# Patient Record
Sex: MALE | Race: WHITE | NOT HISPANIC OR LATINO | Employment: FULL TIME | URBAN - METROPOLITAN AREA
[De-identification: names, ages, dates, MRNs, and addresses within clinical notes are randomized per-mention and may not be internally consistent; named-entity substitution may affect disease eponyms.]

---

## 2017-08-04 ENCOUNTER — OFFICE VISIT (OUTPATIENT)
Dept: INTERNAL MEDICINE | Facility: MEDICAL CENTER | Age: 52
End: 2017-08-04
Payer: MEDICAID

## 2017-08-04 VITALS
HEART RATE: 80 BPM | RESPIRATION RATE: 16 BRPM | DIASTOLIC BLOOD PRESSURE: 70 MMHG | TEMPERATURE: 98.4 F | HEIGHT: 73 IN | SYSTOLIC BLOOD PRESSURE: 112 MMHG | WEIGHT: 280 LBS | BODY MASS INDEX: 37.11 KG/M2

## 2017-08-04 VITALS
WEIGHT: 286.13 LBS | BODY MASS INDEX: 38.76 KG/M2 | HEIGHT: 72 IN | HEART RATE: 60 BPM | OXYGEN SATURATION: 98 % | DIASTOLIC BLOOD PRESSURE: 78 MMHG | TEMPERATURE: 97.9 F | SYSTOLIC BLOOD PRESSURE: 112 MMHG

## 2017-08-04 DIAGNOSIS — R51.9 PERSISTENT HEADACHES: ICD-10-CM

## 2017-08-04 DIAGNOSIS — E66.01 MORBID OBESITY DUE TO EXCESS CALORIES (HCC): ICD-10-CM

## 2017-08-04 DIAGNOSIS — N50.9 TESTICULAR LESION: ICD-10-CM

## 2017-08-04 DIAGNOSIS — F12.90 MARIJUANA USE: ICD-10-CM

## 2017-08-04 DIAGNOSIS — I83.90 VARICOSE VEIN OF LEG: ICD-10-CM

## 2017-08-04 DIAGNOSIS — R79.89 LOW TESTOSTERONE: ICD-10-CM

## 2017-08-04 DIAGNOSIS — R73.03 PREDIABETES: ICD-10-CM

## 2017-08-04 DIAGNOSIS — Z86.73 HX OF TRANSIENT ISCHEMIC ATTACK (TIA): ICD-10-CM

## 2017-08-04 DIAGNOSIS — R12 HEARTBURN: ICD-10-CM

## 2017-08-04 DIAGNOSIS — E66.9 OBESITY (BMI 30-39.9): ICD-10-CM

## 2017-08-04 DIAGNOSIS — G47.33 OSA ON CPAP: ICD-10-CM

## 2017-08-04 DIAGNOSIS — Z12.11 SCREENING FOR COLON CANCER: ICD-10-CM

## 2017-08-04 DIAGNOSIS — G47.00 INSOMNIA, UNSPECIFIED TYPE: ICD-10-CM

## 2017-08-04 DIAGNOSIS — M25.551 RIGHT HIP PAIN: ICD-10-CM

## 2017-08-04 DIAGNOSIS — Z86.73 HX OF TIA (TRANSIENT ISCHEMIC ATTACK) AND STROKE: ICD-10-CM

## 2017-08-04 DIAGNOSIS — F32.A DEPRESSION, UNSPECIFIED DEPRESSION TYPE: ICD-10-CM

## 2017-08-04 PROBLEM — Z72.0 TOBACCO USE: Status: RESOLVED | Noted: 2017-08-04 | Resolved: 2017-08-04

## 2017-08-04 PROBLEM — Z72.0 TOBACCO USE: Status: ACTIVE | Noted: 2017-08-04

## 2017-08-04 PROCEDURE — 99204 OFFICE O/P NEW MOD 45 MIN: CPT | Mod: GC | Performed by: INTERNAL MEDICINE

## 2017-08-04 RX ORDER — ALBUTEROL SULFATE 90 UG/1
2 AEROSOL, METERED RESPIRATORY (INHALATION) EVERY 4 HOURS PRN
COMMUNITY
End: 2017-08-04

## 2017-08-04 RX ORDER — MULTIVITAMIN
TABLET ORAL 2 TIMES DAILY
COMMUNITY
End: 2019-01-07 | Stop reason: CLARIF

## 2017-08-04 ASSESSMENT — PATIENT HEALTH QUESTIONNAIRE - PHQ9
CLINICAL INTERPRETATION OF PHQ2 SCORE: 4
SUM OF ALL RESPONSES TO PHQ QUESTIONS 1-9: 18
5. POOR APPETITE OR OVEREATING: 3 - NEARLY EVERY DAY

## 2017-08-04 NOTE — PATIENT INSTRUCTIONS
Request records from prior PCP  Fasting 8 AM lab work before next visit  Referral to pulmonology, gastroenterology, PT, psychology, MRI brain, US scrotum with doppler  Hip X ray        Insomnia  Insomnia is a sleep disorder that makes it difficult to fall asleep or to stay asleep. Insomnia can cause tiredness (fatigue), low energy, difficulty concentrating, mood swings, and poor performance at work or school.   There are three different ways to classify insomnia:   · Difficulty falling asleep.  · Difficulty staying asleep.  · Waking up too early in the morning.  Any type of insomnia can be long-term (chronic) or short-term (acute). Both are common. Short-term insomnia usually lasts for three months or less. Chronic insomnia occurs at least three times a week for longer than three months.  CAUSES   Insomnia may be caused by another condition, situation, or substance, such as:  · Anxiety.  · Certain medicines.  · Gastroesophageal reflux disease (GERD) or other gastrointestinal conditions.  · Asthma or other breathing conditions.  · Restless legs syndrome, sleep apnea, or other sleep disorders.  · Chronic pain.  · Menopause. This may include hot flashes.  · Stroke.  · Abuse of alcohol, tobacco, or illegal drugs.  · Depression.  · Caffeine.    · Neurological disorders, such as Alzheimer disease.  · An overactive thyroid (hyperthyroidism).  The cause of insomnia may not be known.  RISK FACTORS  Risk factors for insomnia include:  · Gender. Women are more commonly affected than men.  · Age. Insomnia is more common as you get older.  · Stress. This may involve your professional or personal life.  · Income. Insomnia is more common in people with lower income.  · Lack of exercise.    · Irregular work schedule or night shifts.  · Travelling between different time zones.  SIGNS AND SYMPTOMS  If you have insomnia, trouble falling asleep or trouble staying asleep is the main symptom. This may lead to other symptoms, such  as:  · Feeling fatigued.  · Feeling nervous about going to sleep.  · Not feeling rested in the morning.  · Having trouble concentrating.  · Feeling irritable, anxious, or depressed.  TREATMENT   Treatment for insomnia depends on the cause. If your insomnia is caused by an underlying condition, treatment will focus on addressing the condition. Treatment may also include:   · Medicines to help you sleep.  · Counseling or therapy.  · Lifestyle adjustments.  HOME CARE INSTRUCTIONS   · Take medicines only as directed by your health care provider.  · Keep regular sleeping and waking hours. Avoid naps.  · Keep a sleep diary to help you and your health care provider figure out what could be causing your insomnia. Include:    ¨ When you sleep.  ¨ When you wake up during the night.  ¨ How well you sleep.    ¨ How rested you feel the next day.  ¨ Any side effects of medicines you are taking.  ¨ What you eat and drink.    · Make your bedroom a comfortable place where it is easy to fall asleep:  ¨ Put up shades or special blackout curtains to block light from outside.  ¨ Use a white noise machine to block noise.  ¨ Keep the temperature cool.    · Exercise regularly as directed by your health care provider. Avoid exercising right before bedtime.  · Use relaxation techniques to manage stress. Ask your health care provider to suggest some techniques that may work well for you. These may include:  ¨ Breathing exercises.  ¨ Routines to release muscle tension.  ¨ Visualizing peaceful scenes.  · Cut back on alcohol, caffeinated beverages, and cigarettes, especially close to bedtime. These can disrupt your sleep.  · Do not overeat or eat spicy foods right before bedtime. This can lead to digestive discomfort that can make it hard for you to sleep.  · Limit screen use before bedtime. This includes:  ¨ Watching TV.  ¨ Using your smartphone, tablet, and computer.  · Stick to a routine. This can help you fall asleep faster. Try to do a  quiet activity, brush your teeth, and go to bed at the same time each night.  · Get out of bed if you are still awake after 15 minutes of trying to sleep. Keep the lights down, but try reading or doing a quiet activity. When you feel sleepy, go back to bed.  · Make sure that you drive carefully. Avoid driving if you feel very sleepy.  · Keep all follow-up appointments as directed by your health care provider. This is important.  SEEK MEDICAL CARE IF:   · You are tired throughout the day or have trouble in your daily routine due to sleepiness.  · You continue to have sleep problems or your sleep problems get worse.  SEEK IMMEDIATE MEDICAL CARE IF:   · You have serious thoughts about hurting yourself or someone else.     This information is not intended to replace advice given to you by your health care provider. Make sure you discuss any questions you have with your health care provider.     Document Released: 12/15/2001 Document Revised: 01/08/2016 Document Reviewed: 09/18/2015  [employer] Interactive Patient Education ©2016 [employer]

## 2017-08-04 NOTE — MR AVS SNAPSHOT
"        Doni Valverde   2017 8:00 AM   Office Visit   MRN: [id number]    Department:  [facility] - Internal Med   Dept Phone:  [phone]    Description:  Male : 1965   Provider:  Leonardo Sorto M.D.           Reason for Visit     Establish Care     Testicle Pain with lupms x 1yr    Varicose Veins hAD sURGERY    Hip Problem Issues. Trouble walking    Sleep Problem Excessive    Head Pain Right side on and off      Allergies as of 2017     No Known Allergies      You were diagnosed with     Screening for colon cancer   [540407]       CLOTILDE on CPAP   [077691]       Morbid obesity due to excess calories (CMS-HCC)   [6414352]       Depression, unspecified depression type   [4294677]       Low testosterone   [498079]       Testicular lesion   [637547]       Right hip pain   [018129]       Persistent headaches   [110061]         Vital Signs     Blood Pressure Pulse Temperature Height Weight Body Mass Index    112/78 mmHg 60 36.6 °C (97.9 °F) 1.822 m (5' 11.73\") 129.785 kg (286 lb 2 oz) 39.10 kg/m2    Oxygen Saturation Smoking Status                98% Former Smoker          Basic Information     Date Of Birth Sex Race Ethnicity Preferred Language    1965 Male White Non- English      Your appointments     Sep 07, 2017  2:30 PM   Established Patient with Leonardo Sorto M.D.   [facility] / [facility] - Internal Medicine (--)    [address] [place] [id number] [phone]           You will be receiving a confirmation call a few days before your appointment from our automated call confirmation system.              Health Maintenance        Date Due Completion Dates    IMM DTaP/Tdap/Td Vaccine (1 - Tdap) 1985 ---    COLONOSCOPY 2016 ---    IMM INFLUENZA (1) 2017 ---            Current Immunizations     No immunizations on file.      Below and/or attached are the medications your provider expects you to take. Review all of your home medications " and newly ordered medications with your provider and/or pharmacist. Follow medication instructions as directed by your provider and/or pharmacist. Please keep your medication list with you and share with your provider. Update the information when medications are discontinued, doses are changed, or new medications (including over-the-counter products) are added; and carry medication information at all times in the event of emergency situations     Allergies:  No Known Allergies          Medications  Valid as of: August 04, 2017 -  9:38 AM    Generic Name Brand Name Tablet Size Instructions for use    Multiple Vitamin (Tab) Multiple Vitamin Essential  Take  by mouth 2 Times a Day.        .                 Medicines prescribed today were sent to:     [facility] [address] [place] [address] - [address] [id number] [facility] [address]    Phone: [phone] Fax: [phone]    Open 24 Hours?: No      Medication refill instructions:       If your prescription bottle indicates you have medication refills left, it is not necessary to call your provider’s office. Please contact your pharmacy and they will refill your medication.    If your prescription bottle indicates you do not have any refills left, you may request refills at any time through one of the following ways: The online [employer] system (except Urgent Care), by calling your provider’s office, or by asking your pharmacy to contact your provider’s office with a refill request. Medication refills are processed only during regular business hours and may not be available until the next business day. Your provider may request additional information or to have a follow-up visit with you prior to refilling your medication.   *Please Note: Medication refills are assigned a new Rx number when refilled electronically. Your pharmacy may indicate that no refills were authorized even though a new prescription for the same medication is available at the  pharmacy. Please request the medicine by name with the pharmacy before contacting your provider for a refill.        Your To Do List     Future Labs/Procedures Complete By Expires    CHLAMYDIA/GC PCR URINE OR SWAB  As directed 8/4/2018    COMP METABOLIC PANEL  As directed 8/4/2018    DX-HIP-UNILATERAL- WITH PELVIS-4+ RIGHT  As directed 8/4/2018    HEMOGLOBIN A1C  As directed 8/4/2018    HEP B SURFACE ANTIGEN  As directed 8/4/2018    HIV ANTIBODIES  As directed 8/4/2018    LIPID PROFILE  As directed 8/4/2018    MR-BRAIN-WITH & W/O  As directed 8/4/2018    TSH WITH REFLEX TO FT4  As directed 8/4/2018    [id number]  As directed 8/4/2018      Referral     A referral request has been sent to our patient care coordination department. Please allow 3-5 business days for us to process this request and contact you either by phone or mail. If you do not hear from us by the 5th business day, please call us at [phone].        Instructions    Request records from prior PCP  Fasting 8 AM lab work before next visit  Referral to pulmonology, gastroenterology, PT, psychology, MRI brain, US scrotum with doppler  Hip X ray        Insomnia  Insomnia is a sleep disorder that makes it difficult to fall asleep or to stay asleep. Insomnia can cause tiredness (fatigue), low energy, difficulty concentrating, mood swings, and poor performance at work or school.   There are three different ways to classify insomnia:   · Difficulty falling asleep.  · Difficulty staying asleep.  · Waking up too early in the morning.  Any type of insomnia can be long-term (chronic) or short-term (acute). Both are common. Short-term insomnia usually lasts for three months or less. Chronic insomnia occurs at least three times a week for longer than three months.  CAUSES   Insomnia may be caused by another condition, situation, or substance, such as:  · Anxiety.  · Certain medicines.  · Gastroesophageal reflux disease (GERD) or other gastrointestinal  conditions.  · Asthma or other breathing conditions.  · Restless legs syndrome, sleep apnea, or other sleep disorders.  · Chronic pain.  · Menopause. This may include hot flashes.  · Stroke.  · Abuse of alcohol, tobacco, or illegal drugs.  · Depression.  · Caffeine.    · Neurological disorders, such as Alzheimer disease.  · An overactive thyroid (hyperthyroidism).  The cause of insomnia may not be known.  RISK FACTORS  Risk factors for insomnia include:  · Gender. Women are more commonly affected than men.  · Age. Insomnia is more common as you get older.  · Stress. This may involve your professional or personal life.  · Income. Insomnia is more common in people with lower income.  · Lack of exercise.    · Irregular work schedule or night shifts.  · Travelling between different time zones.  SIGNS AND SYMPTOMS  If you have insomnia, trouble falling asleep or trouble staying asleep is the main symptom. This may lead to other symptoms, such as:  · Feeling fatigued.  · Feeling nervous about going to sleep.  · Not feeling rested in the morning.  · Having trouble concentrating.  · Feeling irritable, anxious, or depressed.  TREATMENT   Treatment for insomnia depends on the cause. If your insomnia is caused by an underlying condition, treatment will focus on addressing the condition. Treatment may also include:   · Medicines to help you sleep.  · Counseling or therapy.  · Lifestyle adjustments.  HOME CARE INSTRUCTIONS   · Take medicines only as directed by your health care provider.  · Keep regular sleeping and waking hours. Avoid naps.  · Keep a sleep diary to help you and your health care provider figure out what could be causing your insomnia. Include:    ¨ When you sleep.  ¨ When you wake up during the night.  ¨ How well you sleep.    ¨ How rested you feel the next day.  ¨ Any side effects of medicines you are taking.  ¨ What you eat and drink.    · Make your bedroom a comfortable place where it is easy to fall  asleep:  ¨ Put up shades or special blackout curtains to block light from outside.  ¨ Use a white noise machine to block noise.  ¨ Keep the temperature cool.    · Exercise regularly as directed by your health care provider. Avoid exercising right before bedtime.  · Use relaxation techniques to manage stress. Ask your health care provider to suggest some techniques that may work well for you. These may include:  ¨ Breathing exercises.  ¨ Routines to release muscle tension.  ¨ Visualizing peaceful scenes.  · Cut back on alcohol, caffeinated beverages, and cigarettes, especially close to bedtime. These can disrupt your sleep.  · Do not overeat or eat spicy foods right before bedtime. This can lead to digestive discomfort that can make it hard for you to sleep.  · Limit screen use before bedtime. This includes:  ¨ Watching TV.  ¨ Using your smartphone, tablet, and computer.  · Stick to a routine. This can help you fall asleep faster. Try to do a quiet activity, brush your teeth, and go to bed at the same time each night.  · Get out of bed if you are still awake after 15 minutes of trying to sleep. Keep the lights down, but try reading or doing a quiet activity. When you feel sleepy, go back to bed.  · Make sure that you drive carefully. Avoid driving if you feel very sleepy.  · Keep all follow-up appointments as directed by your health care provider. This is important.  SEEK MEDICAL CARE IF:   · You are tired throughout the day or have trouble in your daily routine due to sleepiness.  · You continue to have sleep problems or your sleep problems get worse.  SEEK IMMEDIATE MEDICAL CARE IF:   · You have serious thoughts about hurting yourself or someone else.     This information is not intended to replace advice given to you by your health care provider. Make sure you discuss any questions you have with your health care provider.     Document Released: 12/15/2001 Document Revised: 01/08/2016 Document Reviewed:  09/18/2015  [employer] Interactive Patient Education ©2016 [employer]            MyChart Status: Patient Declined
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
## 2017-08-07 ENCOUNTER — HOSPITAL ENCOUNTER (OUTPATIENT)
Dept: LAB | Facility: MEDICAL CENTER | Age: 52
End: 2017-08-07
Attending: INTERNAL MEDICINE
Payer: MEDICAID

## 2017-08-07 ENCOUNTER — SLEEP CENTER VISIT (OUTPATIENT)
Dept: SLEEP MEDICINE | Facility: MEDICAL CENTER | Age: 52
End: 2017-08-07
Payer: MEDICAID

## 2017-08-07 VITALS
HEART RATE: 75 BPM | RESPIRATION RATE: 16 BRPM | HEIGHT: 72 IN | WEIGHT: 288 LBS | BODY MASS INDEX: 39.01 KG/M2 | SYSTOLIC BLOOD PRESSURE: 118 MMHG | DIASTOLIC BLOOD PRESSURE: 66 MMHG | OXYGEN SATURATION: 95 %

## 2017-08-07 DIAGNOSIS — Z87.891 FORMER SMOKER: ICD-10-CM

## 2017-08-07 DIAGNOSIS — G47.33 OSA ON CPAP: ICD-10-CM

## 2017-08-07 DIAGNOSIS — E66.9 OBESITY (BMI 30-39.9): ICD-10-CM

## 2017-08-07 PROCEDURE — 99213 OFFICE O/P EST LOW 20 MIN: CPT | Performed by: NURSE PRACTITIONER

## 2017-08-07 NOTE — MR AVS SNAPSHOT
"        Doni Valverde   2017 1:00 PM   Sleep Center Visit   MRN: [id number]    Department:  [facility]   Dept Phone:  [phone]    Description:  Male : 1965   Provider:  ALINA Alfredo           Reason for Visit     Follow-Up           Allergies as of 2017     No Known Allergies      You were diagnosed with     CLOTILDE on CPAP   [196583]       Obesity (BMI 30-39.9)   [588791]       Former smoker   [[id number]]         Vital Signs     Blood Pressure Pulse Respirations Height Weight Body Mass Index    118/66 mmHg 75 16 1.822 m (5' 11.73\") 130.636 kg (288 lb) 39.35 kg/m2    Oxygen Saturation Smoking Status                95% Former Smoker          Basic Information     Date Of Birth Sex Race Ethnicity Preferred Language    1965 Male White Non- English      Your appointments     Aug 09, 2017 12:00 PM   PT New Evaluation 30 Minutes with Payton Holt P.T.   [facility] Physical Therapy [facility] ([address])    [address] [place] [address]  [address]  [facility] [id number] [phone]           Please bring Photo ID, Insurance Cards, list of all Medication and copies of any legal documents (such as Living Will, Power of ) If speaking a language besides English please bring an adult . Please arrive 30 minutes prior for check in and registration.            Sep 07, 2017  2:30 PM   Established Patient with Leonardo Sorto M.D.   [facility] - Internal Medicine (--)    [address]  [address]  [facility] [id number] [phone]           You will be receiving a confirmation call a few days before your appointment from our automated call confirmation system.            Oct 11, 2017 11:20 AM   Follow UP with ALINA Christie   [facility] Sleep Medicine (--)    [address] [facility] [address]  [facility] [id number] [phone]              Problem List              ICD-10-CM Priority Class Noted - Resolved    Heartburn " R12   8/4/2017 - Present    Varicose vein of leg I83.90   8/4/2017 - Present    Insomnia G47.00   8/4/2017 - Present    Obesity (BMI 30-39.9) E66.9   8/4/2017 - Present    CLOTILDE on CPAP G47.33, Z99.89   8/4/2017 - Present    Screening for colon cancer Z12.11   8/4/2017 - Present    Morbid obesity due to excess calories (CMS-HCC) E66.01   8/4/2017 - Present    Testicular lesion N50.9   8/4/2017 - Present    Low testosterone E29.1   8/4/2017 - Present    Persistent headaches R51   8/4/2017 - Present    Depression F32.9   8/4/2017 - Present    Right hip pain M25.551   8/4/2017 - Present    Marijuana use F12.10   8/4/2017 - Present    Prediabetes R73.03   8/4/2017 - Present    Hx of transient ischemic attack (TIA) Z86.73   8/4/2017 - Present    Former smoker Z87.891   8/7/2017 - Present      Health Maintenance        Date Due Completion Dates    IMM DTaP/Tdap/Td Vaccine (1 - Tdap) 6/7/1985 ---    COLONOSCOPY 6/7/2016 ---    IMM INFLUENZA (1) 9/1/2017 ---            Current Immunizations     No immunizations on file.      Below and/or attached are the medications your provider expects you to take. Review all of your home medications and newly ordered medications with your provider and/or pharmacist. Follow medication instructions as directed by your provider and/or pharmacist. Please keep your medication list with you and share with your provider. Update the information when medications are discontinued, doses are changed, or new medications (including over-the-counter products) are added; and carry medication information at all times in the event of emergency situations     Allergies:  No Known Allergies          Medications  Valid as of: August 07, 2017 -  1:34 PM    Generic Name Brand Name Tablet Size Instructions for use    Aspirin (Tablet Delayed Response) ECOTRIN 81 MG Take 1 Tab by mouth every day.        Multiple Vitamin (Tab) Multiple Vitamin Essential  Take  by mouth 2 Times a Day.        .                    Medicines prescribed today were sent to:     [facility] [address] [place] [address], [place] - [address] [facility]    [address] [facility] [place] [address]    Phone: [phone] Fax: [phone]    Open 24 Hours?: No      Medication refill instructions:       If your prescription bottle indicates you have medication refills left, it is not necessary to call your provider’s office. Please contact your pharmacy and they will refill your medication.    If your prescription bottle indicates you do not have any refills left, you may request refills at any time through one of the following ways: The online [employer] system (except Urgent Care), by calling your provider’s office, or by asking your pharmacy to contact your provider’s office with a refill request. Medication refills are processed only during regular business hours and may not be available until the next business day. Your provider may request additional information or to have a follow-up visit with you prior to refilling your medication.   *Please Note: Medication refills are assigned a new Rx number when refilled electronically. Your pharmacy may indicate that no refills were authorized even though a new prescription for the same medication is available at the pharmacy. Please request the medicine by name with the pharmacy before contacting your provider for a refill.           [employer] Status: Patient Declined

## 2017-08-07 NOTE — PROGRESS NOTES
Chief Complaint   Patient presents with   • Follow-Up       HPI:  Doni Valverde is a 52 y.o. year old male here today for follow-up on CLOTILDE. Last OV was 11/24/15. PCP referred him back due to increased fatigue and insomnia. PSG indicates severe obstructive sleep apnea with an overall AHI 53.5 and a minimum oxygen saturation of 74%. Patient was started on CPAP 11 cm H2O. Compliance card 5/3/2017 through 7/31/2017 indicates 5.6% compliance, average nightly usage of 4.5 hours, no significant mastectomy and an AHI 0.9. Patient recently saw his primary care provider and noted persistent headaches, fatigue and insomnia. PCP recommended starting melatonin. Patient hasn't been able to use machine due to age of mask/supplies. BMI 39. Patient was 275lbs during last sleep study, today 288lbs. He is very motivated to restart therapy.    In regards to respiratory status, patient is currently not using inhalers. In the past PFT indicated moderate mixed obstructive and restrictive spirometry with normal lung volumes. DLCO is normal. He was placed on Dulera 2 puffs twice daily and albuterol HFA inhaler as needed. He states since treating sleep apnea his dyspnea and heart burn resolved. He did have one episode of GERD 1 month ago and really wants to restart cpap therapy. Echo 16 2015 indicated LVEF of 65% with normal RVSP calculated. No significant valve abnormalities noted.          ROS: As per HPI and otherwise negative if not stated.    Past Medical History   Diagnosis Date   • Sleep apnea    • Varicose vein of leg    • Headache    • Hx of transient ischemic attack (TIA)    • Insomnia        Past Surgical History   Procedure Laterality Date   • Other       Varicose vein stripping       Family History   Problem Relation Age of Onset   • Cancer Father      Prostate   • Diabetes Father    • Kidney Disease Father    • Other Mother      Hypoglycemia   • Other Mother      Vein issues       Social History     Social History   •  "Marital Status: Single     Spouse Name: N/A   • Number of Children: N/A   • Years of Education: N/A     Occupational History   • Not on file.     Social History Main Topics   • Smoking status: Former Smoker -- 1.00 packs/day for 32 years     Types: Cigarettes     Start date: 01/01/1980     Quit date: 01/01/2012   • Smokeless tobacco: Never Used   • Alcohol Use: No   • Drug Use: Yes     Special: Marijuana   • Sexual Activity: Not on file     Other Topics Concern   • Not on file     Social History Narrative       Allergies as of 08/07/2017   • (No Known Allergies)        @Vital signs for this encounter:  Filed Vitals:    08/07/17 1254   Height: 1.822 m (5' 11.73\")   Weight: 130.636 kg (288 lb)   Weight % change since last entry.: 0 %   BP: 118/66   Pulse: 75   BMI (Calculated): 39.35   Resp: 16   O2 sat % room air: 95 %       Current medications as of today   Current Outpatient Prescriptions   Medication Sig Dispense Refill   • Multiple Vitamin Essential Tab Take  by mouth 2 Times a Day.     • aspirin EC (ECOTRIN) 81 MG Tablet Delayed Response Take 1 Tab by mouth every day. 30 Tab 2     No current facility-administered medications for this visit.         Physical Exam:   Gen:           Alert and oriented, No apparent distress. Mood and affect appropriate, normal interaction with examiner.  Eyes:          PERRL, EOM intact, sclere white, conjunctive moist.  Ears:          Not examined.   Hearing:     Grossly intact.  Nose:          Normal, no lesions or deformities.  Dentition:    Good dentition.  Oropharynx:   Tongue normal, posterior pharynx without erythema or exudate.  Mallampati Classification: 3  Neck:        Supple, trachea midline, no masses.  Respiratory Effort: No intercostal retractions or use of accessory muscles.   Lung Auscultation:      Clear to auscultation bilaterally; no rales, rhonchi or wheezing.  CV:            Regular rate and rhythm. No murmurs, rubs or gallops.  Abd:           Not examined. "   Lymphadenopathy: Not examined.  Gait and Station: Normal.  Digits and Nails: No clubbing, cyanosis, petechiae, or nodes.   Cranial Nerves: II-XII grossly intact.  Skin:        No rashes, lesions or ulcers noted.               Ext:           No cyanosis or edema.      Assessment:  1. CLOTILDE on CPAP     2. Obesity (BMI 30-39.9)  HEIGHT AND WEIGHT   3. Former smoker         Immunizations:    Flu:not given  Pneumovax 23:not given  Prevnar 13:not given    Plan:  1. Continue CPAP nightly. DME mask /supplies now.  2. Discussed sleep hygiene.  3. Encouraged weight loss.  4. Follow up in 2 months with compliance card with APRN, sooner if needed.

## 2017-08-09 ENCOUNTER — APPOINTMENT (OUTPATIENT)
Dept: PHYSICAL THERAPY | Facility: REHABILITATION | Age: 52
End: 2017-08-09
Attending: INTERNAL MEDICINE
Payer: MEDICAID

## 2017-09-07 ENCOUNTER — APPOINTMENT (OUTPATIENT)
Dept: INTERNAL MEDICINE | Facility: MEDICAL CENTER | Age: 52
End: 2017-09-07
Payer: MEDICAID

## 2017-12-02 ENCOUNTER — HOSPITAL ENCOUNTER (EMERGENCY)
Facility: MEDICAL CENTER | Age: 52
End: 2017-12-02
Attending: EMERGENCY MEDICINE
Payer: MEDICAID

## 2017-12-02 ENCOUNTER — APPOINTMENT (OUTPATIENT)
Dept: RADIOLOGY | Facility: MEDICAL CENTER | Age: 52
End: 2017-12-02
Attending: EMERGENCY MEDICINE
Payer: MEDICAID

## 2017-12-02 VITALS
DIASTOLIC BLOOD PRESSURE: 81 MMHG | HEART RATE: 66 BPM | OXYGEN SATURATION: 94 % | HEIGHT: 73 IN | SYSTOLIC BLOOD PRESSURE: 117 MMHG | TEMPERATURE: 99 F | BODY MASS INDEX: 38.19 KG/M2 | WEIGHT: 288.14 LBS | RESPIRATION RATE: 16 BRPM

## 2017-12-02 DIAGNOSIS — J01.00 ACUTE MAXILLARY SINUSITIS, RECURRENCE NOT SPECIFIED: ICD-10-CM

## 2017-12-02 LAB
ALBUMIN SERPL BCP-MCNC: 4.1 G/DL (ref 3.2–4.9)
ALBUMIN/GLOB SERPL: 1 G/DL
ALP SERPL-CCNC: 104 U/L (ref 30–99)
ALT SERPL-CCNC: 42 U/L (ref 2–50)
ANION GAP SERPL CALC-SCNC: 11 MMOL/L (ref 0–11.9)
AST SERPL-CCNC: 22 U/L (ref 12–45)
BASOPHILS # BLD AUTO: 0.9 % (ref 0–1.8)
BASOPHILS # BLD: 0.12 K/UL (ref 0–0.12)
BILIRUB SERPL-MCNC: 0.4 MG/DL (ref 0.1–1.5)
BUN SERPL-MCNC: 26 MG/DL (ref 8–22)
CALCIUM SERPL-MCNC: 9.8 MG/DL (ref 8.5–10.5)
CHLORIDE SERPL-SCNC: 100 MMOL/L (ref 96–112)
CO2 SERPL-SCNC: 23 MMOL/L (ref 20–33)
CREAT SERPL-MCNC: 1.2 MG/DL (ref 0.5–1.4)
EOSINOPHIL # BLD AUTO: 0.37 K/UL (ref 0–0.51)
EOSINOPHIL NFR BLD: 2.7 % (ref 0–6.9)
ERYTHROCYTE [DISTWIDTH] IN BLOOD BY AUTOMATED COUNT: 43 FL (ref 35.9–50)
GFR SERPL CREATININE-BSD FRML MDRD: >60 ML/MIN/1.73 M 2
GLOBULIN SER CALC-MCNC: 4 G/DL (ref 1.9–3.5)
GLUCOSE SERPL-MCNC: 87 MG/DL (ref 65–99)
HCT VFR BLD AUTO: 48.7 % (ref 42–52)
HGB BLD-MCNC: 16.2 G/DL (ref 14–18)
IMM GRANULOCYTES # BLD AUTO: 0.03 K/UL (ref 0–0.11)
IMM GRANULOCYTES NFR BLD AUTO: 0.2 % (ref 0–0.9)
LYMPHOCYTES # BLD AUTO: 4.04 K/UL (ref 1–4.8)
LYMPHOCYTES NFR BLD: 29.5 % (ref 22–41)
MCH RBC QN AUTO: 28.5 PG (ref 27–33)
MCHC RBC AUTO-ENTMCNC: 33.3 G/DL (ref 33.7–35.3)
MCV RBC AUTO: 85.6 FL (ref 81.4–97.8)
MONOCYTES # BLD AUTO: 1.1 K/UL (ref 0–0.85)
MONOCYTES NFR BLD AUTO: 8 % (ref 0–13.4)
NEUTROPHILS # BLD AUTO: 8.02 K/UL (ref 1.82–7.42)
NEUTROPHILS NFR BLD: 58.7 % (ref 44–72)
NRBC # BLD AUTO: 0 K/UL
NRBC BLD AUTO-RTO: 0 /100 WBC
PLATELET # BLD AUTO: 333 K/UL (ref 164–446)
PMV BLD AUTO: 10.2 FL (ref 9–12.9)
POTASSIUM SERPL-SCNC: 3.7 MMOL/L (ref 3.6–5.5)
PROT SERPL-MCNC: 8.1 G/DL (ref 6–8.2)
RBC # BLD AUTO: 5.69 M/UL (ref 4.7–6.1)
SODIUM SERPL-SCNC: 134 MMOL/L (ref 135–145)
WBC # BLD AUTO: 13.7 K/UL (ref 4.8–10.8)

## 2017-12-02 PROCEDURE — 71010 DX-CHEST-LIMITED (1 VIEW): CPT

## 2017-12-02 PROCEDURE — 700117 HCHG RX CONTRAST REV CODE 255: Performed by: EMERGENCY MEDICINE

## 2017-12-02 PROCEDURE — 85025 COMPLETE CBC W/AUTO DIFF WBC: CPT

## 2017-12-02 PROCEDURE — 99284 EMERGENCY DEPT VISIT MOD MDM: CPT

## 2017-12-02 PROCEDURE — 36415 COLL VENOUS BLD VENIPUNCTURE: CPT

## 2017-12-02 PROCEDURE — 70487 CT MAXILLOFACIAL W/DYE: CPT

## 2017-12-02 PROCEDURE — 80053 COMPREHEN METABOLIC PANEL: CPT

## 2017-12-02 RX ORDER — METRONIDAZOLE 500 MG/1
500 TABLET ORAL 3 TIMES DAILY
Qty: 30 TAB | Refills: 0 | Status: SHIPPED | OUTPATIENT
Start: 2017-12-02 | End: 2017-12-12

## 2017-12-02 RX ORDER — PREDNISONE 20 MG/1
60 TABLET ORAL DAILY
Qty: 30 TAB | Refills: 0 | Status: SHIPPED | OUTPATIENT
Start: 2017-12-02 | End: 2017-12-07

## 2017-12-02 RX ORDER — OXYMETAZOLINE HYDROCHLORIDE 0.05 G/100ML
2 SPRAY NASAL 2 TIMES DAILY
Qty: 1 BOTTLE | Refills: 0 | Status: SHIPPED | OUTPATIENT
Start: 2017-12-02 | End: 2018-03-21

## 2017-12-02 RX ORDER — CEFDINIR 300 MG/1
300 CAPSULE ORAL 2 TIMES DAILY
Qty: 20 CAP | Refills: 0 | Status: SHIPPED | OUTPATIENT
Start: 2017-12-02 | End: 2018-03-21

## 2017-12-02 RX ADMIN — IOHEXOL 70 ML: 350 INJECTION, SOLUTION INTRAVENOUS at 20:43

## 2017-12-03 NOTE — DISCHARGE INSTRUCTIONS

## 2017-12-03 NOTE — ED NOTES
"Pt to triage with multiple complaints, c/o \" being on antibx on/off for 10 months, it's killing his stomach\", had 5 teeth pulled 1approx 1 month ago, states \" he feels there is an infection/ drainage, worried it's in his blood stream\" , c/o cough, \" everything is draining down the back of his throat   "

## 2017-12-03 NOTE — ED PROVIDER NOTES
ED Provider Note    HPI: Patient is a 52-year-old male who presented to the emergency department December 2, 2017 at 6:01 PM with a chief complaint of cough and sinus drainage.    Patient states he's been on and off antibiotic treatment for the last 10 months. He had 5 teeth pulled about a month ago and is awaiting some sort of surgery closed connection between one of the extraction sites in the sinus. He notes posterior sinus drainage that is foul tasting. He also notes a cough. No fever no headache no neck stiffness no photophobia. No change in bladder or bowel habits. No other somatic    Review of Systems: Positive for sinus drainage cough negative for fever or headache neck stiffness photophobia change in bladder or bowel habits. Review of systems reviewed with patient, all other systems negative    Past medical/surgical history: Dental problems status post multiple dental extractions sleep apnea varicose vein headache TIA    Medications: Ecotrin    Allergies: None    Social History: 32-pack-year history of smoking quit in 1980 alcohol use      Physical exam: Constitutional: Somewhat obese male awake alert appeared tired  Vital signs: temperature 99.0 pulse 72 respirations 16 blood pressure 117/81 pulse oximetry 95%  EYES: PERRL, EOMI, Conjunctivae and sclera normal, eyelids normal bilaterally.  Neck: Trachea midline. No cervical masses seen or palpated. Normal range of motion, supple. No meningeal signs elicited.  Cardiac: Regular rate and rhythm. S1-S2 present. No S3 or S4 present. No murmurs, rubs, or gallops heard. No edema or varicosities were seen.   Lungs: Clear to auscultation with good aeration throughout. No wheezes, rales, or rhonchi heard. Patient's chest wall moved symmetrically with each respiratory effort. Patient was not making use of accessory muscles of respiration in breathing.  Abdomen: Soft nontender to palpation. No rebound or guarding elicited. No organomegaly identified. No pulsatile  abdominal masses identified.   Musculoskeletal:  no  pain with palpitation or movement of muscle, bone or joint , no obvious musculoskeletal deformities identified.  Neurologic: alert and awake answers questions appropriately. Moves all four extremities independently, no gross focal abnormalities identified. Normal strength and motor.  Skin: no rash or lesion seen, no palpable dermatologic lesions identified.  Psychiatric: not anxious, delusional, or hallucinating.    Medical decision making:  Laboratory studies obtained (please see labsheet for all results) significant findings included a moderate leukocytosis of 13.7 with a slight increase in absolute neutrophils consistent with infection. Chemistry panel was unrevealing    Chest x-ray obtained; no evidence pneumonia seen.    After discussion with radiologist, CT imaging of the maxillofacial area obtained with IV contrast. Inflammatory changes of the left maxillary sinus left ethmoid sinus and left aspect of the frontal sinuses were noted which could represent either sinusitis or secondary sinusitis from tooth extraction. Absent bone is noted in the floor left maxillary sinus felt to be consistent with resection following tooth extraction.    ENT consulted; they recommended antibiotic treatment to include anaerobe coverage and follow-up with the patient's dental specialist on Monday. This seems reasonable. Patient discharged on Flagyl and Ceftin there. Patient given the usual discharge instructions for sinusitis. He is carefully counseled return immediately for any significant headache visual disturbance vomiting or other problems.    Patient does not appear to be systemically ill or toxic. He certainly did seem to require outpatient treatment with close follow-up and we have arranged this. Patient verbalized understanding of the above instructions and stated would comply    Impression sinusitis, maxillary, acute

## 2017-12-03 NOTE — ED NOTES
Patient dc'd to home w/ family. Patient alert and oriented x 4. Patient ambulatory w/ steady gait. Patient verbalizes understanding of discharge instructions follow up care and prescriptions x 4. Patient denies questions upon discharge.     Per ERP verbal order, patient provided w/ lab results to take to his oral surgeon follow up.

## 2018-02-02 ENCOUNTER — HOSPITAL ENCOUNTER (EMERGENCY)
Facility: MEDICAL CENTER | Age: 53
End: 2018-02-02
Attending: EMERGENCY MEDICINE
Payer: MEDICAID

## 2018-02-02 ENCOUNTER — APPOINTMENT (OUTPATIENT)
Dept: RADIOLOGY | Facility: MEDICAL CENTER | Age: 53
End: 2018-02-02
Attending: EMERGENCY MEDICINE
Payer: MEDICAID

## 2018-02-02 VITALS
DIASTOLIC BLOOD PRESSURE: 82 MMHG | BODY MASS INDEX: 37.6 KG/M2 | TEMPERATURE: 98.6 F | RESPIRATION RATE: 16 BRPM | SYSTOLIC BLOOD PRESSURE: 121 MMHG | OXYGEN SATURATION: 95 % | WEIGHT: 283.73 LBS | HEART RATE: 72 BPM | HEIGHT: 73 IN

## 2018-02-02 DIAGNOSIS — S93.402A SPRAIN OF LEFT ANKLE, UNSPECIFIED LIGAMENT, INITIAL ENCOUNTER: ICD-10-CM

## 2018-02-02 PROCEDURE — 302874 HCHG BANDAGE ACE 2 OR 3""

## 2018-02-02 PROCEDURE — 99284 EMERGENCY DEPT VISIT MOD MDM: CPT

## 2018-02-02 PROCEDURE — 73610 X-RAY EXAM OF ANKLE: CPT | Mod: LT

## 2018-02-02 PROCEDURE — 29515 APPLICATION SHORT LEG SPLINT: CPT

## 2018-02-02 ASSESSMENT — PAIN SCALES - GENERAL: PAINLEVEL_OUTOF10: 6
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
## 2018-02-02 NOTE — DISCHARGE INSTRUCTIONS
Cryotherapy  Cryotherapy means treatment with cold. Ice or gel packs can be used to reduce both pain and swelling. Ice is the most helpful within the first 24 to 48 hours after an injury or flare-up from overusing a muscle or joint. Sprains, strains, spasms, burning pain, shooting pain, and aches can all be eased with ice. Ice can also be used when recovering from surgery. Ice is effective, has very few side effects, and is safe for most people to use.  PRECAUTIONS   Ice is not a safe treatment option for people with:  · Raynaud phenomenon. This is a condition affecting small blood vessels in the extremities. Exposure to cold may cause your problems to return.  · Cold hypersensitivity. There are many forms of cold hypersensitivity, including:  ¨ Cold urticaria. Red, itchy hives appear on the skin when the tissues begin to warm after being iced.  ¨ Cold erythema. This is a red, itchy rash caused by exposure to cold.  ¨ Cold hemoglobinuria. Red blood cells break down when the tissues begin to warm after being iced. The hemoglobin that carry oxygen are passed into the urine because they cannot combine with blood proteins fast enough.  · Numbness or altered sensitivity in the area being iced.  If you have any of the following conditions, do not use ice until you have discussed cryotherapy with your caregiver:  · Heart conditions, such as arrhythmia, angina, or chronic heart disease.  · High blood pressure.  · Healing wounds or open skin in the area being iced.  · Current infections.  · Rheumatoid arthritis.  · Poor circulation.  · Diabetes.  Ice slows the blood flow in the region it is applied. This is beneficial when trying to stop inflamed tissues from spreading irritating chemicals to surrounding tissues. However, if you expose your skin to cold temperatures for too long or without the proper protection, you can damage your skin or nerves. Watch for signs of skin damage due to cold.  HOME CARE INSTRUCTIONS  Follow  these tips to use ice and cold packs safely.  · Place a dry or damp towel between the ice and skin. A damp towel will cool the skin more quickly, so you may need to shorten the time that the ice is used.  · For a more rapid response, add gentle compression to the ice.  · Ice for no more than 10 to 20 minutes at a time. The bonier the area you are icing, the less time it will take to get the benefits of ice.  · Check your skin after 5 minutes to make sure there are no signs of a poor response to cold or skin damage.  · Rest 20 minutes or more between uses.  · Once your skin is numb, you can end your treatment. You can test numbness by very lightly touching your skin. The touch should be so light that you do not see the skin dimple from the pressure of your fingertip. When using ice, most people will feel these normal sensations in this order: cold, burning, aching, and numbness.  · Do not use ice on someone who cannot communicate their responses to pain, such as small children or people with dementia.  HOW TO MAKE AN ICE PACK  Ice packs are the most common way to use ice therapy. Other methods include ice massage, ice baths, and cryosprays. Muscle creams that cause a cold, tingly feeling do not offer the same benefits that ice offers and should not be used as a substitute unless recommended by your caregiver.  To make an ice pack, do one of the following:  · Place crushed ice or a bag of frozen vegetables in a sealable plastic bag. Squeeze out the excess air. Place this bag inside another plastic bag. Slide the bag into a pillowcase or place a damp towel between your skin and the bag.  · Mix 3 parts water with 1 part rubbing alcohol. Freeze the mixture in a sealable plastic bag. When you remove the mixture from the freezer, it will be slushy. Squeeze out the excess air. Place this bag inside another plastic bag. Slide the bag into a pillowcase or place a damp towel between your skin and the bag.  SEEK MEDICAL CARE  IF:  · You develop white spots on your skin. This may give the skin a blotchy (mottled) appearance.  · Your skin turns blue or pale.  · Your skin becomes waxy or hard.  · Your swelling gets worse.  MAKE SURE YOU:   · Understand these instructions.  · Will watch your condition.  · Will get help right away if you are not doing well or get worse.     This information is not intended to replace advice given to you by your health care provider. Make sure you discuss any questions you have with your health care provider.     Document Released: 08/13/2012 Document Revised: 01/08/2016 Document Reviewed: 08/13/2012  [employer] Interactive Patient Education ©2016 [employer]      Ankle Sprain  An ankle sprain is an injury to the strong, fibrous tissues (ligaments) that hold the bones of your ankle joint together.   CAUSES  An ankle sprain is usually caused by a fall or by twisting your ankle. Ankle sprains most commonly occur when you step on the outer edge of your foot, and your ankle turns inward. People who participate in sports are more prone to these types of injuries.   SYMPTOMS   · Pain in your ankle. The pain may be present at rest or only when you are trying to stand or walk.  · Swelling.  · Bruising. Bruising may develop immediately or within 1 to 2 days after your injury.  · Difficulty standing or walking, particularly when turning corners or changing directions.  DIAGNOSIS   Your caregiver will ask you details about your injury and perform a physical exam of your ankle to determine if you have an ankle sprain. During the physical exam, your caregiver will press on and apply pressure to specific areas of your foot and ankle. Your caregiver will try to move your ankle in certain ways. An X-ray exam may be done to be sure a bone was not broken or a ligament did not separate from one of the bones in your ankle (avulsion fracture).   TREATMENT   Certain types of braces can help stabilize your ankle. Your caregiver can  make a recommendation for this. Your caregiver may recommend the use of medicine for pain. If your sprain is severe, your caregiver may refer you to a surgeon who helps to restore function to parts of your skeletal system (orthopedist) or a physical therapist.  HOME CARE INSTRUCTIONS   · Apply ice to your injury for 1-2 days or as directed by your caregiver. Applying ice helps to reduce inflammation and pain.  ¨ Put ice in a plastic bag.  ¨ Place a towel between your skin and the bag.  ¨ Leave the ice on for 15-20 minutes at a time, every 2 hours while you are awake.  · Only take over-the-counter or prescription medicines for pain, discomfort, or fever as directed by your caregiver.  · Elevate your injured ankle above the level of your heart as much as possible for 2-3 days.  · If your caregiver recommends crutches, use them as instructed. Gradually put weight on the affected ankle. Continue to use crutches or a cane until you can walk without feeling pain in your ankle.  · If you have a plaster splint, wear the splint as directed by your caregiver. Do not rest it on anything harder than a pillow for the first 24 hours. Do not put weight on it. Do not get it wet. You may take it off to take a shower or bath.  · You may have been given an elastic bandage to wear around your ankle to provide support. If the elastic bandage is too tight (you have numbness or tingling in your foot or your foot becomes cold and blue), adjust the bandage to make it comfortable.  · If you have an air splint, you may blow more air into it or let air out to make it more comfortable. You may take your splint off at night and before taking a shower or bath. Wiggle your toes in the splint several times per day to decrease swelling.  SEEK MEDICAL CARE IF:   · You have rapidly increasing bruising or swelling.  · Your toes feel extremely cold or you lose feeling in your foot.  · Your pain is not relieved with medicine.  SEEK IMMEDIATE MEDICAL CARE  IF:  · Your toes are numb or blue.  · You have severe pain that is increasing.  MAKE SURE YOU:   · Understand these instructions.  · Will watch your condition.  · Will get help right away if you are not doing well or get worse.     This information is not intended to replace advice given to you by your health care provider. Make sure you discuss any questions you have with your health care provider.     Document Released: 12/18/2006 Document Revised: 01/08/2016 Document Reviewed: 12/29/2012  [employer] Interactive Patient Education ©2016 [employer]

## 2018-02-12 ENCOUNTER — APPOINTMENT (OUTPATIENT)
Dept: SLEEP MEDICINE | Facility: MEDICAL CENTER | Age: 53
End: 2018-02-12
Payer: MEDICAID

## 2018-03-21 ENCOUNTER — OFFICE VISIT (OUTPATIENT)
Dept: MEDICAL GROUP | Facility: MEDICAL CENTER | Age: 53
End: 2018-03-21
Attending: FAMILY MEDICINE
Payer: MEDICAID

## 2018-03-21 VITALS
TEMPERATURE: 98.2 F | SYSTOLIC BLOOD PRESSURE: 112 MMHG | WEIGHT: 294 LBS | DIASTOLIC BLOOD PRESSURE: 70 MMHG | HEART RATE: 76 BPM | RESPIRATION RATE: 14 BRPM | BODY MASS INDEX: 38.97 KG/M2 | HEIGHT: 73 IN | OXYGEN SATURATION: 94 %

## 2018-03-21 DIAGNOSIS — M25.559 ARTHRALGIA OF HIP, UNSPECIFIED LATERALITY: ICD-10-CM

## 2018-03-21 DIAGNOSIS — G47.33 OSA ON CPAP: ICD-10-CM

## 2018-03-21 DIAGNOSIS — H53.8 BLURRY VISION, BILATERAL: ICD-10-CM

## 2018-03-21 DIAGNOSIS — G89.4 CHRONIC PAIN SYNDROME: ICD-10-CM

## 2018-03-21 DIAGNOSIS — F39 MOOD DISORDER (HCC): ICD-10-CM

## 2018-03-21 DIAGNOSIS — Z00.00 HEALTH CARE MAINTENANCE: ICD-10-CM

## 2018-03-21 DIAGNOSIS — R05.3 CHRONIC COUGH: ICD-10-CM

## 2018-03-21 PROCEDURE — 99203 OFFICE O/P NEW LOW 30 MIN: CPT | Performed by: FAMILY MEDICINE

## 2018-03-21 PROCEDURE — 99204 OFFICE O/P NEW MOD 45 MIN: CPT | Performed by: FAMILY MEDICINE

## 2018-03-21 RX ORDER — BENZONATATE 100 MG/1
100 CAPSULE ORAL 3 TIMES DAILY PRN
Qty: 60 CAP | Refills: 0 | Status: SHIPPED | OUTPATIENT
Start: 2018-03-21 | End: 2018-04-04

## 2018-03-21 RX ORDER — GABAPENTIN 300 MG/1
300 CAPSULE ORAL 3 TIMES DAILY
Qty: 90 CAP | Refills: 3 | Status: SHIPPED | OUTPATIENT
Start: 2018-03-21 | End: 2019-01-07 | Stop reason: CLARIF

## 2018-03-21 RX ORDER — IBUPROFEN 800 MG/1
800 TABLET ORAL EVERY 8 HOURS PRN
Qty: 60 TAB | Refills: 3 | Status: SHIPPED | OUTPATIENT
Start: 2018-03-21 | End: 2019-01-07 | Stop reason: CLARIF

## 2018-03-21 RX ORDER — ALBUTEROL SULFATE 90 UG/1
2 AEROSOL, METERED RESPIRATORY (INHALATION) EVERY 6 HOURS PRN
Qty: 8.5 G | Refills: 3 | Status: SHIPPED | OUTPATIENT
Start: 2018-03-21 | End: 2019-01-07 | Stop reason: CLARIF

## 2018-03-21 ASSESSMENT — ENCOUNTER SYMPTOMS
NAUSEA: 0
PALPITATIONS: 0
CHILLS: 0
FEVER: 0
INSOMNIA: 1
PHOTOPHOBIA: 0
HALLUCINATIONS: 0
DOUBLE VISION: 0
VOMITING: 0
EYE PAIN: 0
NECK PAIN: 0
SPEECH CHANGE: 0
NERVOUS/ANXIOUS: 0
ABDOMINAL PAIN: 0
SHORTNESS OF BREATH: 0
COUGH: 1
SENSORY CHANGE: 0
EYE DISCHARGE: 0
TREMORS: 0
HEADACHES: 0
HEARTBURN: 0
TINGLING: 1
BACK PAIN: 1
BLURRED VISION: 1
SPUTUM PRODUCTION: 1
DIZZINESS: 0
DEPRESSION: 1
EYE REDNESS: 0

## 2018-03-21 ASSESSMENT — PATIENT HEALTH QUESTIONNAIRE - PHQ9: CLINICAL INTERPRETATION OF PHQ2 SCORE: 0

## 2018-03-21 ASSESSMENT — LIFESTYLE VARIABLES: SUBSTANCE_ABUSE: 0

## 2018-03-21 NOTE — PROGRESS NOTES
Subjective:      Doni Valverde is a 52 y.o. male who presents with Cough (x1 month) and Establish Care            Patient 52-year-old male here to establish the clinic today. He has a history of chronic pain in his hips, ankles, and back, obstructive sleep apnea on CPAP, chronic cough that has been worsening over the last few weeks, rapidly worsening eyesight, anxiety and depression, and possible metabolic syndrome.    He has been having worsening pain in his hips ankles and back for the last several years now. Patient states that he used to work as a  but over the last 7-8 years has not been able to work due to the chronic pain he has been suffering from. He had seen many physicians in the past for his chronic pain, but no effective treatments were performed. He has been told in the past that he may eventually need a hip replacement to help his chronic hip pain. He has been using Motrin to help alleviate his pain and it does help but his pain has become worse. Will order an x-ray of his hip, ankle and back. Also will start Neurontin at 300 mg 3 times a day. Discussed the potential adverse effects of both medications. Also discussed the physical therapy and orthopedic referral if needed. We'll continue to follow.    He had been diagnosed with obstructive sleep apnea and had been placed on a CPAP. He had been seeing a pulmonologist but recently his pulmonologist informed him that they are no longer accepting his insurance. So will be refer to a pulmonologist at accepts his insurance.  He has also been having a chronic cough that has been present for several years. Recently his cough has become more persistent. He states that one night he almost passed out due to his persistent cough. He has been seen in the emergency rooms for this cough and had been started on inhalers and antibiotics, which have not worked to alleviate his cough. A CT scan of his chest will also be ordered, and will have patient  continue using his albuterol inhaler as directed and a Spiriva will be started. We'll also order a pulmonary function tests for further evaluation of his overall lung function. He is a former smoker but has not smoked in the last 10 years per patient he also had been a user of marijuana but is no longer able to smoke marijuana. We'll continue to follow.    Due to his chronic cough as well as chronic pain patient states that he is becoming depressed. He also had been told she has anger management issues by his previous provider. He is not having any urges to harm self or others. Discussed the potential for disorders to worsen chronic pain. Will refer to psychology for additional assistance in management of his chronic pain psychology as well as possible depression. We'll continue to follow. ER precautions given if he should have any sudden worsening of his current situation.    Is also noticed that his eyesight has been worsening at a more rapid rate. He states that diabetes runs in his family but he has not been officially told that he was diabetic or have high blood sugars. Will order lab work to further assess. Discussed low glycemic index foods as well as exercising as tolerated despite his chronic pain. We'll continue to follow.    His surgical history includes a vein stripping procedure.    His family history is significant for diabetes and hypertension.    He is  and currently unemployed.    He is a former smoker, denies drinking alcohol and eats marijuana.        Review of Systems   Constitutional: Negative for chills and fever.   HENT: Negative for hearing loss and tinnitus.    Eyes: Positive for blurred vision. Negative for double vision, photophobia, pain, discharge and redness.   Respiratory: Positive for cough and sputum production. Negative for shortness of breath.    Cardiovascular: Negative for chest pain and palpitations.   Gastrointestinal: Negative for abdominal pain, heartburn, nausea and  "vomiting.   Genitourinary: Negative.    Musculoskeletal: Positive for back pain and joint pain. Negative for neck pain.   Skin: Negative for itching and rash.   Neurological: Positive for tingling. Negative for dizziness, tremors, sensory change, speech change and headaches.   Endo/Heme/Allergies: Negative.    Psychiatric/Behavioral: Positive for depression. Negative for hallucinations, substance abuse and suicidal ideas. The patient has insomnia. The patient is not nervous/anxious.           Objective:     /70   Pulse 76   Temp 36.8 °C (98.2 °F)   Resp 14   Ht 1.854 m (6' 1\")   Wt (!) 133.4 kg (294 lb)   SpO2 94%   BMI 38.79 kg/m²      Physical Exam   Constitutional: He is oriented to person, place, and time.   BMI 38   HENT:   Head: Normocephalic and atraumatic.   Nose: Nose normal.   Mouth/Throat: Oropharynx is clear and moist.   Eyes: Conjunctivae and EOM are normal. Pupils are equal, round, and reactive to light.   Neck: Normal range of motion. Neck supple. No thyromegaly present.   Cardiovascular: Normal rate, regular rhythm and normal heart sounds.  Exam reveals no friction rub.    No murmur heard.  Pulmonary/Chest: Effort normal and breath sounds normal. No respiratory distress. He has no wheezes. He has no rales.   Abdominal: Soft. Bowel sounds are normal. He exhibits no distension. There is no tenderness.   Musculoskeletal:   Decreased ROM of affected extremities and back in flexion   Lymphadenopathy:     He has no cervical adenopathy.   Neurological: He is alert and oriented to person, place, and time.   Skin: Skin is warm and dry.   Psychiatric: He has a normal mood and affect. His behavior is normal.   Nursing note and vitals reviewed.              Assessment/Plan:     1. Chronic pain syndrome  Will have patient try Neurontin as directed, and will refill his ibuprofen today. To continue using as directed. Will refer to pain management for assistance in management of his chronic pain and " x-rays will be ordered of his affected joints and back. We'll continue to follow.  - REFERRAL TO PAIN CLINIC  - DX-ANKLE 3+ VIEWS RIGHT; Future  - DX-HIP-BILATERAL-WITH PELVIS-5+; Future  - DX-LUMBAR SPINE-2 OR 3 VIEWS; Future    2. CLOTILDE on CPAP  Will have him continue to use his CPAP as directed. Will refer back to pulmonary for further assistance in management of this obstructive sleep apnea. We'll continue to follow.  - CT-CHEST (THORAX) W/O; Future  - PULMONARY FUNCTION TESTS Test requested: Complete Pulmonary Function Test  - REFERRAL TO PULMONOLOGY    3. Chronic cough  CT scan of his lungs will be ordered today. Will also have patient use a rescue inhaler as well as a maintenance Spiriva inhaler. A pulmonary function test will also be ordered and referral back to pulmonology. ER precautions were given to patient.  - CT-CHEST (THORAX) W/O; Future  - PULMONARY FUNCTION TESTS Test requested: Complete Pulmonary Function Test  - REFERRAL TO PULMONOLOGY    4. Arthralgia of hip, unspecified laterality  See above plan.    5. Health care maintenance  Will order blood work for a further assessment of his chronic medical issues. We'll continue to follow.  - COMP METABOLIC PANEL; Future  - LIPID PROFILE; Future  - CBC WITH DIFFERENTIAL; Future  - VITAMIN D,25 HYDROXY; Future    6. Mood disorder (CMS-HCC)  A referral to psychology will be made for possible pain psychology as well as assistance in management of his mood disorder. ER precautions have been given to patient.  - REFERRAL TO PSYCHOLOGY    7. Blurry vision, bilateral  Referral to ophthalmology will be made for his progressive blurriness of vision. We'll continue to follow.  - REFERRAL TO OPHTHALMOLOGY

## 2018-03-30 ENCOUNTER — HOSPITAL ENCOUNTER (OUTPATIENT)
Dept: RADIOLOGY | Facility: MEDICAL CENTER | Age: 53
End: 2018-03-30
Attending: FAMILY MEDICINE
Payer: MEDICAID

## 2018-03-30 DIAGNOSIS — R05.3 CHRONIC COUGH: ICD-10-CM

## 2018-03-30 DIAGNOSIS — G47.33 OSA ON CPAP: ICD-10-CM

## 2018-03-30 DIAGNOSIS — G89.4 CHRONIC PAIN SYNDROME: ICD-10-CM

## 2018-03-30 PROCEDURE — 71250 CT THORAX DX C-: CPT

## 2018-03-30 PROCEDURE — 72100 X-RAY EXAM L-S SPINE 2/3 VWS: CPT

## 2018-03-30 PROCEDURE — 73523 X-RAY EXAM HIPS BI 5/> VIEWS: CPT

## 2018-03-30 PROCEDURE — 73610 X-RAY EXAM OF ANKLE: CPT | Mod: RT

## 2018-04-03 ENCOUNTER — TELEPHONE (OUTPATIENT)
Dept: MEDICAL GROUP | Facility: MEDICAL CENTER | Age: 53
End: 2018-04-03

## 2018-04-04 ENCOUNTER — OFFICE VISIT (OUTPATIENT)
Dept: MEDICAL GROUP | Facility: MEDICAL CENTER | Age: 53
End: 2018-04-04
Attending: FAMILY MEDICINE
Payer: MEDICAID

## 2018-04-04 ENCOUNTER — TELEPHONE (OUTPATIENT)
Dept: MEDICAL GROUP | Facility: MEDICAL CENTER | Age: 53
End: 2018-04-04

## 2018-04-04 VITALS
BODY MASS INDEX: 38.3 KG/M2 | SYSTOLIC BLOOD PRESSURE: 115 MMHG | DIASTOLIC BLOOD PRESSURE: 80 MMHG | TEMPERATURE: 96.9 F | WEIGHT: 289 LBS | HEART RATE: 72 BPM | HEIGHT: 73 IN | OXYGEN SATURATION: 95 % | RESPIRATION RATE: 14 BRPM

## 2018-04-04 DIAGNOSIS — R05.3 CHRONIC COUGH: ICD-10-CM

## 2018-04-04 DIAGNOSIS — G47.33 OSA ON CPAP: ICD-10-CM

## 2018-04-04 DIAGNOSIS — M25.50 POLYARTHRALGIA: ICD-10-CM

## 2018-04-04 PROCEDURE — 99213 OFFICE O/P EST LOW 20 MIN: CPT | Performed by: FAMILY MEDICINE

## 2018-04-04 PROCEDURE — 99214 OFFICE O/P EST MOD 30 MIN: CPT | Performed by: FAMILY MEDICINE

## 2018-04-04 ASSESSMENT — ENCOUNTER SYMPTOMS
SPEECH CHANGE: 0
ABDOMINAL PAIN: 0
COUGH: 1
FEVER: 0
SENSORY CHANGE: 0
SPUTUM PRODUCTION: 0
BACK PAIN: 1
VOMITING: 0
TINGLING: 1
FOCAL WEAKNESS: 0
NAUSEA: 0
TREMORS: 0
PALPITATIONS: 0
CHILLS: 0
SHORTNESS OF BREATH: 0

## 2018-04-04 NOTE — PROGRESS NOTES
Subjective:      Doni Valverde is a 52 y.o. male who presents with Follow-Up and Results (xray, ct)            Pt here for follow up of his recent xrays and CT scan. The results are as follows:    1.  Moderate LEFT and mild RIGHT hip degenerative changes.  2.  No hip or pelvic fracture.    1.  Minimal multilevel spondylosis deformans.  2.  No lumbar spine fracture or segmental malalignment.    1.  Minimal degenerative change of RIGHT ankle.  2.  No fracture or dislocation.  3.  Nodular densities within the medial soft tissues of the RIGHT lower leg, likely venous varicosities, however etiology is uncertain.    He has been referred to rheumatology and pain management for a further evaluation of his arthritic pains. Will have him continue to take his neurontin as directed and will continue to follow. Discussed a referral to ortho and neurosurgery for his back if his problems should progressively worsen. ER precaution have also been given to pt.    1.  No pneumonia or pneumothorax.  2.  Small nonspecific RIGHT lower lobe nodules measuring up to 5 mm.    Low Risk: No routine follow-up    High Risk: Optional CT at 12 months    Comments: Use most suspicious nodule as guide to management. Follow-up intervals may vary according to size and risk.    Low Risk - Minimal or absent history of smoking and of other known risk factors.    High Risk - History of smoking or of other known risk factors.    Note: These recommendations do not apply to lung cancer screening, patients with immunosuppression, or patients with known primary cancer.    Fleischner Society 2017 Guidelines for Management of Incidentally Detected Pulmonary Nodules in Adults    Despite the normal results on his chest CT scan, he is still having a chronic cough occasionally productive. He is not running any fevers or having any other upper respiratory symptoms. Will have patient get a pulmonary function test and continue using his inhalers as previously  "directed. He has also been referred to a pulmonologist for a further assessment of his chronic cough. We'll continue to follow.     Current medications, allergies, and problem list reviewed with patient and updated in EPIC.          Review of Systems   Constitutional: Negative for chills and fever.   HENT: Negative for hearing loss and tinnitus.    Respiratory: Positive for cough. Negative for sputum production and shortness of breath.    Cardiovascular: Negative for chest pain and palpitations.   Gastrointestinal: Negative for abdominal pain, nausea and vomiting.   Musculoskeletal: Positive for back pain and joint pain.   Neurological: Positive for tingling. Negative for tremors, sensory change, speech change and focal weakness.          Objective:     /80   Pulse 72   Temp 36.1 °C (96.9 °F)   Resp 14   Ht 1.854 m (6' 1\")   Wt (!) 131.1 kg (289 lb)   SpO2 95%   BMI 38.13 kg/m²      Physical Exam   Constitutional: He is oriented to person, place, and time.   BMI 38     HENT:   Head: Normocephalic and atraumatic.   Cardiovascular: Normal rate, regular rhythm and normal heart sounds.  Exam reveals no friction rub.    No murmur heard.  Pulmonary/Chest: Effort normal and breath sounds normal. No respiratory distress. He has no wheezes. He has no rales.   Abdominal: Soft. Bowel sounds are normal. He exhibits no distension. There is no tenderness.   Musculoskeletal:   Decreased ROM of affected extremities in flexion    Neurological: He is alert and oriented to person, place, and time.   Skin: Skin is warm and dry.   Nursing note and vitals reviewed.              Assessment/Plan:     1. Chronic cough  Reviewed the results of his recent chest CT scan. We'll have him continue to use his inhalers as previously directed. A pulmonary function test has also been ordered. Will also have patient schedule an appointment with pulmonology for a further assessment of his chronic cough. We'll continue to follow and ER " precautions have been given to patient.  - PULMONARY FUNCTION TESTS Test requested: Complete Pulmonary Function Test  - REFERRAL TO RHEUMATOLOGY    2. Polyarthralgia  Review the results of his recent x-rays. Will have patient continue to use his medication as directed. Will also have patient schedule an appointment with pain management and referral was also made to rheumatology for a further evaluation of his multiple joint arthritic involvement.  - PULMONARY FUNCTION TESTS Test requested: Complete Pulmonary Function Test  - REFERRAL TO RHEUMATOLOGY    3. CLOTILDE on CPAP  Will have him follow up with pulm for continued management. Will continue to follow.

## 2018-04-04 NOTE — TELEPHONE ENCOUNTER
MEDICATION PRIOR AUTHORIZATION NEEDED:    1. Name of Medication: spiriva    2. Requested By (Name of Pharmacy): [phone] (home)        3. Is insurance on file current? yes    4. What is the name & phone number of the 3rd party payor? Medicaid o

## 2018-04-10 ENCOUNTER — TELEPHONE (OUTPATIENT)
Dept: MEDICAL GROUP | Facility: MEDICAL CENTER | Age: 53
End: 2018-04-10

## 2018-04-11 NOTE — TELEPHONE ENCOUNTER
MEDICATION PRIOR AUTHORIZATION NEEDED:    1. Name of Medication: spiriva    2. Requested By ([facility] of [facility]): [facility]     3. Is insurance on file current? yes    4. What is the name & phone number of the 3rd party payor? Medicaid hmo

## 2018-07-25 ENCOUNTER — HOSPITAL ENCOUNTER (EMERGENCY)
Dept: HOSPITAL 8 - ED | Age: 53
Discharge: HOME | End: 2018-07-25
Payer: MEDICAID

## 2018-07-25 VITALS — SYSTOLIC BLOOD PRESSURE: 139 MMHG | DIASTOLIC BLOOD PRESSURE: 84 MMHG

## 2018-07-25 VITALS — BODY MASS INDEX: 38.86 KG/M2 | HEIGHT: 73 IN | WEIGHT: 293.21 LBS

## 2018-07-25 DIAGNOSIS — J44.9: ICD-10-CM

## 2018-07-25 DIAGNOSIS — J98.01: Primary | ICD-10-CM

## 2018-07-25 DIAGNOSIS — Z87.891: ICD-10-CM

## 2018-07-25 PROCEDURE — 99284 EMERGENCY DEPT VISIT MOD MDM: CPT

## 2018-07-25 PROCEDURE — 71046 X-RAY EXAM CHEST 2 VIEWS: CPT

## 2018-07-25 PROCEDURE — 94640 AIRWAY INHALATION TREATMENT: CPT

## 2018-08-06 ENCOUNTER — HOSPITAL ENCOUNTER (EMERGENCY)
Dept: HOSPITAL 8 - ED | Age: 53
Discharge: HOME | End: 2018-08-06
Payer: MEDICAID

## 2018-08-06 VITALS — DIASTOLIC BLOOD PRESSURE: 79 MMHG | SYSTOLIC BLOOD PRESSURE: 117 MMHG

## 2018-08-06 VITALS — BODY MASS INDEX: 38.57 KG/M2 | HEIGHT: 73 IN | WEIGHT: 291.01 LBS
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
## 2018-08-06 DIAGNOSIS — S80.12XA: Primary | ICD-10-CM

## 2018-08-06 DIAGNOSIS — S40.022A: ICD-10-CM
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
## 2018-08-06 DIAGNOSIS — J44.9: ICD-10-CM
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
## 2018-08-06 PROCEDURE — 99284 EMERGENCY DEPT VISIT MOD MDM: CPT

## 2019-01-07 ENCOUNTER — HOSPITAL ENCOUNTER (INPATIENT)
Facility: MEDICAL CENTER | Age: 54
LOS: 3 days | DRG: 871 | End: 2019-01-10
Attending: EMERGENCY MEDICINE | Admitting: INTERNAL MEDICINE
Payer: COMMERCIAL

## 2019-01-07 ENCOUNTER — APPOINTMENT (OUTPATIENT)
Dept: RADIOLOGY | Facility: MEDICAL CENTER | Age: 54
DRG: 871 | End: 2019-01-07
Attending: INTERNAL MEDICINE
Payer: COMMERCIAL

## 2019-01-07 ENCOUNTER — APPOINTMENT (OUTPATIENT)
Dept: RADIOLOGY | Facility: MEDICAL CENTER | Age: 54
DRG: 871 | End: 2019-01-07
Attending: EMERGENCY MEDICINE
Payer: COMMERCIAL

## 2019-01-07 DIAGNOSIS — J10.1 INFLUENZA A: ICD-10-CM

## 2019-01-07 DIAGNOSIS — A41.9 SEPSIS, DUE TO UNSPECIFIED ORGANISM: ICD-10-CM

## 2019-01-07 DIAGNOSIS — S40.012A CONTUSION OF LEFT SHOULDER, INITIAL ENCOUNTER: ICD-10-CM

## 2019-01-07 PROBLEM — J06.9 URI (UPPER RESPIRATORY INFECTION): Status: ACTIVE | Noted: 2019-01-07

## 2019-01-07 PROBLEM — R74.01 TRANSAMINITIS: Status: ACTIVE | Noted: 2019-01-07

## 2019-01-07 PROBLEM — J96.01 ACUTE RESPIRATORY FAILURE WITH HYPOXIA (HCC): Status: ACTIVE | Noted: 2019-01-07

## 2019-01-07 LAB
ALBUMIN SERPL BCP-MCNC: 4.1 G/DL (ref 3.2–4.9)
ALBUMIN/GLOB SERPL: 1.2 G/DL
ALP SERPL-CCNC: 77 U/L (ref 30–99)
ALT SERPL-CCNC: 106 U/L (ref 2–50)
ANION GAP SERPL CALC-SCNC: 10 MMOL/L (ref 0–11.9)
APAP SERPL-MCNC: <10 UG/ML (ref 10–30)
AST SERPL-CCNC: 144 U/L (ref 12–45)
BASOPHILS # BLD AUTO: 0.6 % (ref 0–1.8)
BASOPHILS # BLD: 0.05 K/UL (ref 0–0.12)
BILIRUB SERPL-MCNC: 0.5 MG/DL (ref 0.1–1.5)
BUN SERPL-MCNC: 16 MG/DL (ref 8–22)
CALCIUM SERPL-MCNC: 9.1 MG/DL (ref 8.5–10.5)
CHLORIDE SERPL-SCNC: 100 MMOL/L (ref 96–112)
CO2 SERPL-SCNC: 24 MMOL/L (ref 20–33)
CREAT SERPL-MCNC: 1.22 MG/DL (ref 0.5–1.4)
EKG IMPRESSION: NORMAL
EOSINOPHIL # BLD AUTO: 0.1 K/UL (ref 0–0.51)
EOSINOPHIL NFR BLD: 1.2 % (ref 0–6.9)
ERYTHROCYTE [DISTWIDTH] IN BLOOD BY AUTOMATED COUNT: 45.1 FL (ref 35.9–50)
ETHANOL BLD-MCNC: 0 G/DL
FLUAV RNA SPEC QL NAA+PROBE: POSITIVE
FLUBV RNA SPEC QL NAA+PROBE: NEGATIVE
GLOBULIN SER CALC-MCNC: 3.4 G/DL (ref 1.9–3.5)
GLUCOSE SERPL-MCNC: 97 MG/DL (ref 65–99)
HCT VFR BLD AUTO: 50.1 % (ref 42–52)
HGB BLD-MCNC: 16.8 G/DL (ref 14–18)
IMM GRANULOCYTES # BLD AUTO: 0.03 K/UL (ref 0–0.11)
IMM GRANULOCYTES NFR BLD AUTO: 0.4 % (ref 0–0.9)
LACTATE BLD-SCNC: 1.5 MMOL/L (ref 0.5–2)
LACTATE BLD-SCNC: 2.3 MMOL/L (ref 0.5–2)
LYMPHOCYTES # BLD AUTO: 0.6 K/UL (ref 1–4.8)
LYMPHOCYTES NFR BLD: 7.2 % (ref 22–41)
MCH RBC QN AUTO: 29.2 PG (ref 27–33)
MCHC RBC AUTO-ENTMCNC: 33.5 G/DL (ref 33.7–35.3)
MCV RBC AUTO: 87.1 FL (ref 81.4–97.8)
MONOCYTES # BLD AUTO: 1.43 K/UL (ref 0–0.85)
MONOCYTES NFR BLD AUTO: 17.2 % (ref 0–13.4)
NEUTROPHILS # BLD AUTO: 6.12 K/UL (ref 1.82–7.42)
NEUTROPHILS NFR BLD: 73.4 % (ref 44–72)
NRBC # BLD AUTO: 0 K/UL
NRBC BLD-RTO: 0 /100 WBC
PLATELET # BLD AUTO: 235 K/UL (ref 164–446)
PMV BLD AUTO: 10.6 FL (ref 9–12.9)
POTASSIUM SERPL-SCNC: 4.1 MMOL/L (ref 3.6–5.5)
PROCALCITONIN SERPL-MCNC: 0.1 NG/ML
PROT SERPL-MCNC: 7.5 G/DL (ref 6–8.2)
RBC # BLD AUTO: 5.75 M/UL (ref 4.7–6.1)
SODIUM SERPL-SCNC: 134 MMOL/L (ref 135–145)
WBC # BLD AUTO: 8.3 K/UL (ref 4.8–10.8)

## 2019-01-07 PROCEDURE — 96365 THER/PROPH/DIAG IV INF INIT: CPT

## 2019-01-07 PROCEDURE — 71045 X-RAY EXAM CHEST 1 VIEW: CPT

## 2019-01-07 PROCEDURE — 700102 HCHG RX REV CODE 250 W/ 637 OVERRIDE(OP): Performed by: EMERGENCY MEDICINE

## 2019-01-07 PROCEDURE — 84145 PROCALCITONIN (PCT): CPT

## 2019-01-07 PROCEDURE — 700101 HCHG RX REV CODE 250: Performed by: EMERGENCY MEDICINE

## 2019-01-07 PROCEDURE — 93005 ELECTROCARDIOGRAM TRACING: CPT | Performed by: EMERGENCY MEDICINE

## 2019-01-07 PROCEDURE — 93005 ELECTROCARDIOGRAM TRACING: CPT

## 2019-01-07 PROCEDURE — 87040 BLOOD CULTURE FOR BACTERIA: CPT

## 2019-01-07 PROCEDURE — A9270 NON-COVERED ITEM OR SERVICE: HCPCS | Performed by: INTERNAL MEDICINE

## 2019-01-07 PROCEDURE — 80053 COMPREHEN METABOLIC PANEL: CPT

## 2019-01-07 PROCEDURE — A9270 NON-COVERED ITEM OR SERVICE: HCPCS | Performed by: EMERGENCY MEDICINE

## 2019-01-07 PROCEDURE — 96375 TX/PRO/DX INJ NEW DRUG ADDON: CPT

## 2019-01-07 PROCEDURE — 770021 HCHG ROOM/CARE - ISO PRIVATE

## 2019-01-07 PROCEDURE — 700102 HCHG RX REV CODE 250 W/ 637 OVERRIDE(OP): Performed by: INTERNAL MEDICINE

## 2019-01-07 PROCEDURE — 700105 HCHG RX REV CODE 258: Performed by: EMERGENCY MEDICINE

## 2019-01-07 PROCEDURE — 94640 AIRWAY INHALATION TREATMENT: CPT

## 2019-01-07 PROCEDURE — 80074 ACUTE HEPATITIS PANEL: CPT

## 2019-01-07 PROCEDURE — 85025 COMPLETE CBC W/AUTO DIFF WBC: CPT

## 2019-01-07 PROCEDURE — 80307 DRUG TEST PRSMV CHEM ANLYZR: CPT

## 2019-01-07 PROCEDURE — 99285 EMERGENCY DEPT VISIT HI MDM: CPT

## 2019-01-07 PROCEDURE — 87502 INFLUENZA DNA AMP PROBE: CPT

## 2019-01-07 PROCEDURE — 83605 ASSAY OF LACTIC ACID: CPT | Mod: 91

## 2019-01-07 PROCEDURE — 700111 HCHG RX REV CODE 636 W/ 250 OVERRIDE (IP): Performed by: EMERGENCY MEDICINE

## 2019-01-07 PROCEDURE — 700111 HCHG RX REV CODE 636 W/ 250 OVERRIDE (IP): Performed by: INTERNAL MEDICINE

## 2019-01-07 PROCEDURE — 73030 X-RAY EXAM OF SHOULDER: CPT | Mod: LT

## 2019-01-07 PROCEDURE — 36415 COLL VENOUS BLD VENIPUNCTURE: CPT

## 2019-01-07 RX ORDER — IPRATROPIUM BROMIDE AND ALBUTEROL SULFATE 2.5; .5 MG/3ML; MG/3ML
3 SOLUTION RESPIRATORY (INHALATION) ONCE
Status: COMPLETED | OUTPATIENT
Start: 2019-01-07 | End: 2019-01-07

## 2019-01-07 RX ORDER — AZITHROMYCIN 500 MG/1
500 INJECTION, POWDER, LYOPHILIZED, FOR SOLUTION INTRAVENOUS ONCE
Status: COMPLETED | OUTPATIENT
Start: 2019-01-07 | End: 2019-01-07

## 2019-01-07 RX ORDER — OSELTAMIVIR PHOSPHATE 75 MG/1
75 CAPSULE ORAL EVERY 12 HOURS
Status: DISCONTINUED | OUTPATIENT
Start: 2019-01-07 | End: 2019-01-10 | Stop reason: HOSPADM

## 2019-01-07 RX ORDER — IBUPROFEN 600 MG/1
600 TABLET ORAL ONCE
Status: COMPLETED | OUTPATIENT
Start: 2019-01-07 | End: 2019-01-07

## 2019-01-07 RX ORDER — AMOXICILLIN 250 MG
2 CAPSULE ORAL 2 TIMES DAILY
Status: DISCONTINUED | OUTPATIENT
Start: 2019-01-07 | End: 2019-01-10 | Stop reason: HOSPADM

## 2019-01-07 RX ORDER — AMOXICILLIN AND CLAVULANATE POTASSIUM 875; 125 MG/1; MG/1
1 TABLET, FILM COATED ORAL EVERY 12 HOURS
Status: DISCONTINUED | OUTPATIENT
Start: 2019-01-07 | End: 2019-01-09

## 2019-01-07 RX ORDER — PREDNISONE 20 MG/1
40 TABLET ORAL DAILY
Status: DISCONTINUED | OUTPATIENT
Start: 2019-01-07 | End: 2019-01-10

## 2019-01-07 RX ORDER — ACETAMINOPHEN 325 MG/1
650 TABLET ORAL ONCE
Status: COMPLETED | OUTPATIENT
Start: 2019-01-07 | End: 2019-01-07

## 2019-01-07 RX ORDER — POLYETHYLENE GLYCOL 3350 17 G/17G
1 POWDER, FOR SOLUTION ORAL
Status: DISCONTINUED | OUTPATIENT
Start: 2019-01-07 | End: 2019-01-10 | Stop reason: HOSPADM

## 2019-01-07 RX ORDER — DOXYCYCLINE 100 MG/1
100 TABLET ORAL EVERY 12 HOURS
Status: DISCONTINUED | OUTPATIENT
Start: 2019-01-08 | End: 2019-01-09

## 2019-01-07 RX ORDER — LACTOBACILLUS RHAMNOSUS GG 10B CELL
1 CAPSULE ORAL
Status: DISCONTINUED | OUTPATIENT
Start: 2019-01-08 | End: 2019-01-10 | Stop reason: HOSPADM

## 2019-01-07 RX ORDER — OXYCODONE HYDROCHLORIDE AND ACETAMINOPHEN 5; 325 MG/1; MG/1
1 TABLET ORAL ONCE
Status: COMPLETED | OUTPATIENT
Start: 2019-01-07 | End: 2019-01-07

## 2019-01-07 RX ORDER — BISACODYL 10 MG
10 SUPPOSITORY, RECTAL RECTAL
Status: DISCONTINUED | OUTPATIENT
Start: 2019-01-07 | End: 2019-01-10 | Stop reason: HOSPADM

## 2019-01-07 RX ORDER — SODIUM CHLORIDE 9 MG/ML
30 INJECTION, SOLUTION INTRAVENOUS ONCE
Status: COMPLETED | OUTPATIENT
Start: 2019-01-07 | End: 2019-01-07

## 2019-01-07 RX ADMIN — ENOXAPARIN SODIUM 40 MG: 100 INJECTION SUBCUTANEOUS at 21:55

## 2019-01-07 RX ADMIN — IPRATROPIUM BROMIDE AND ALBUTEROL SULFATE 3 ML: .5; 3 SOLUTION RESPIRATORY (INHALATION) at 15:39

## 2019-01-07 RX ADMIN — PREDNISONE 40 MG: 20 TABLET ORAL at 21:54

## 2019-01-07 RX ADMIN — OSELTAMIVIR PHOSPHATE 75 MG: 75 CAPSULE ORAL at 21:53

## 2019-01-07 RX ADMIN — STANDARDIZED SENNA CONCENTRATE AND DOCUSATE SODIUM 2 TABLET: 8.6; 5 TABLET, FILM COATED ORAL at 21:55

## 2019-01-07 RX ADMIN — OXYCODONE AND ACETAMINOPHEN 1 TABLET: 5; 325 TABLET ORAL at 14:13

## 2019-01-07 RX ADMIN — AZITHROMYCIN FOR INJECTION INJECTION, POWDER, LYOPHILIZED, FOR SOLUTION 500 MG: 500 INJECTION INTRAVENOUS at 15:59

## 2019-01-07 RX ADMIN — ACETAMINOPHEN 650 MG: 325 TABLET, FILM COATED ORAL at 14:13

## 2019-01-07 RX ADMIN — SODIUM CHLORIDE 3 G: 900 INJECTION INTRAVENOUS at 14:17

## 2019-01-07 RX ADMIN — IBUPROFEN 600 MG: 600 TABLET, FILM COATED ORAL at 15:59

## 2019-01-07 RX ADMIN — AMOXICILLIN AND CLAVULANATE POTASSIUM 1 TABLET: 875; 125 TABLET, FILM COATED ORAL at 21:54

## 2019-01-07 RX ADMIN — SODIUM CHLORIDE 3879 ML: 9 INJECTION, SOLUTION INTRAVENOUS at 14:18

## 2019-01-07 ASSESSMENT — COPD QUESTIONNAIRES
DO YOU EVER COUGH UP ANY MUCUS OR PHLEGM?: NO/ONLY WITH OCCASIONAL COLDS OR INFECTIONS
DURING THE PAST 4 WEEKS HOW MUCH DID YOU FEEL SHORT OF BREATH: SOME OF THE TIME
HAVE YOU SMOKED AT LEAST 100 CIGARETTES IN YOUR ENTIRE LIFE: YES
HAVE YOU SMOKED AT LEAST 100 CIGARETTES IN YOUR ENTIRE LIFE: YES
IN THE PAST 12 MONTHS DO YOU DO LESS THAN YOU USED TO BECAUSE OF YOUR BREATHING PROBLEMS: AGREE
COPD SCREENING SCORE: 5
COPD SCREENING SCORE: 5
DURING THE PAST 4 WEEKS HOW MUCH DID YOU FEEL SHORT OF BREATH: SOME OF THE TIME
DO YOU EVER COUGH UP ANY MUCUS OR PHLEGM?: NO/ONLY WITH OCCASIONAL COLDS OR INFECTIONS

## 2019-01-07 ASSESSMENT — COGNITIVE AND FUNCTIONAL STATUS - GENERAL
DAILY ACTIVITIY SCORE: 24
MOBILITY SCORE: 24
SUGGESTED CMS G CODE MODIFIER MOBILITY: CH
SUGGESTED CMS G CODE MODIFIER DAILY ACTIVITY: CH

## 2019-01-07 ASSESSMENT — PATIENT HEALTH QUESTIONNAIRE - PHQ9
1. LITTLE INTEREST OR PLEASURE IN DOING THINGS: NOT AT ALL
2. FEELING DOWN, DEPRESSED, IRRITABLE, OR HOPELESS: NOT AT ALL
SUM OF ALL RESPONSES TO PHQ9 QUESTIONS 1 AND 2: 0

## 2019-01-07 ASSESSMENT — LIFESTYLE VARIABLES
EVER_SMOKED: YES
ALCOHOL_USE: NO
EVER_SMOKED: YES

## 2019-01-07 ASSESSMENT — PAIN SCALES - GENERAL: PAINLEVEL_OUTOF10: 0

## 2019-01-07 NOTE — ED PROVIDER NOTES
ED Provider Note    Scribed for Neto Mcelroy M.D. by Camryn Mcmullen. 1/7/2019  1:28 PM    Primary care provider: Cody Vega M.D.  Means of arrival: Private vehicle    History obtained from: Patient  History limited by: None    CHIEF COMPLAINT  Chief Complaint   Patient presents with   • Dizziness   • Fall     fell yesterday r/t dizziness pt states he woke up this way    • Shoulder Injury     left shoulder pain post fall    • Shortness of Breath   • Cough     x 1 wk    • Blurred Vision   • Weakness     generalized weakness      HPI  Edarmond Valverde is a 53 y.o. male with a history of TIA and COPD who presents to the Emergency Department with generalized malaise onset 1 day ago. Patient reports having a cough, shortness of breath, and fever onset 1 week ago. He confirms cough exacerbation when laying on his right side. He states experiencing a fall earlier today and complains of left shoulder pain. Family member reports patient has been confused and not his normal self. Patient also reports associated nausea and headache. Patient uses CPAP at home currently. He denies receiving his annual influenza vaccination. Patient also denies any vomiting.     REVIEW OF SYSTEMS  Pertinent negatives include no emesis.   As above, all other systems reviewed and are negative.   See HPI for further details.     PAST MEDICAL HISTORY   has a past medical history of Headache(784.0); transient ischemic attack (TIA); Insomnia; Sleep apnea; and Varicose vein of leg.    SURGICAL HISTORY   has a past surgical history that includes other.    SOCIAL HISTORY  Social History   Substance Use Topics   • Smoking status: Former Smoker     Packs/day: 1.00     Years: 32.00     Types: Cigarettes     Start date: 1/1/1980     Quit date: 1/1/2012   • Smokeless tobacco: Never Used   • Alcohol use No      History   Drug Use   • Types: Marijuana       FAMILY HISTORY  Family History   Problem Relation Age of Onset   • Cancer Father         Prostate   •  Diabetes Father    • Kidney Disease Father    • Other Mother         Hypoglycemia/Vein issues       CURRENT MEDICATIONS    Current Facility-Administered Medications:   •  azithromycin (ZITHROMAX) injection 500 mg, 500 mg, Intravenous, Once, Neto Mcelroy M.D.  •  ibuprofen (MOTRIN) tablet 600 mg, 600 mg, Oral, Once, Neto Mcelroy M.D.  •  albuterol-ipratropium (DUO-NEB) nebulizer solution, 3 mL, Nebulization, Once, Neto Mcelroy M.D., 3 mL at 01/07/19 1539  •  senna-docusate (PERICOLACE or SENOKOT S) 8.6-50 MG per tablet 2 Tab, 2 Tab, Oral, BID **AND** polyethylene glycol/lytes (MIRALAX) PACKET 1 Packet, 1 Packet, Oral, QDAY PRN **AND** magnesium hydroxide (MILK OF MAGNESIA) suspension 30 mL, 30 mL, Oral, QDAY PRN **AND** bisacodyl (DULCOLAX) suppository 10 mg, 10 mg, Rectal, QDAY PRN, Geoff Simpson M.D.  •  enoxaparin (LOVENOX) inj 40 mg, 40 mg, Subcutaneous, DAILY, Geoff Simpson M.D.  •  Respiratory Care per Protocol, , Nebulization, Continuous RT, Geoff Simpson M.D.  •  predniSONE (DELTASONE) tablet 40 mg, 40 mg, Oral, DAILY, Geoff Simpson M.D.  •  doxycycline monohydrate (ADOXA) tablet 100 mg, 100 mg, Oral, Q12HRS, Geoff Simpson M.D.  •  [START ON 1/8/2019] lactobacillus rhamnosus (CULTURELLE) capsule 1 Cap, 1 Cap, Oral, QDAY with Breakfast, Geoff Simpson M.D.  •  amoxicillin-clavulanate (AUGMENTIN) 875-125 MG per tablet 1 Tab, 1 Tab, Oral, Q12HRS, Geoff Simpson M.D.    Current Outpatient Prescriptions:   •  asa/apap/caffeine (EXCEDRIN) 250-250-65 MG Tab, Take 1 Tab by mouth every 6 hours as needed for Headache., Disp: , Rfl:     ALLERGIES  No Known Allergies    PHYSICAL EXAM  VITAL SIGNS: /54   Pulse (!) 104   Temp (!) 38.7 °C (101.6 °F) (Oral)   Resp 18   Wt (!) 129.3 kg (285 lb)   SpO2 95%   BMI 37.60 kg/m²     Constitutional: Well developed, Well nourished, Mild distress, Non-toxic appearance.   HENT: Normocephalic, Atraumatic, Bilateral external ears normal, Oropharynx  is clear mucous membranes are moist. No oral exudates or nasal discharge.   Eyes: Pupils are equal round and reactive, EOMI, Conjunctiva normal, No discharge.   Neck: Normal range of motion, No tenderness, Supple, No stridor. No meningismus.   Cardiovascular: Regular rate and rhythm without murmur rub or gallop. No significant swelling of lower extremities.   Thorax & Lungs: Expiratory wheezes, Bronchial spasm, No rhonchi or rales. There is no chest wall tenderness.   Abdomen: Moderately obese, Soft non-tender non-distended. There is no rebound or guarding. No organomegaly is appreciated. Bowel sounds are normal.  Skin: Normal without rash.   Back: No CVA or spinal tenderness.   Extremities: Intact distal pulses, No edema, No tenderness, No cyanosis, No clubbing. Capillary refill is less than 2 seconds.  Musculoskeletal: Good range of motion in all major joints. No tenderness to palpation or major deformities noted.   Neurologic: Alert & oriented x 3, Normal motor function, Normal sensory function, No focal deficits noted. Reflexes are normal.  Psychiatric: Affect normal, Judgment normal, Mood normal. There is no suicidal ideation or patient reported hallucinations.     DIAGNOSTIC STUDIES / PROCEDURES    LABS  Labs Reviewed   LACTIC ACID - Abnormal; Notable for the following:        Result Value    Lactic Acid 2.3 (*)     All other components within normal limits   CBC WITH DIFFERENTIAL - Abnormal; Notable for the following:     MCHC 33.5 (*)     Neutrophils-Polys 73.40 (*)     Lymphocytes 7.20 (*)     Monocytes 17.20 (*)     Lymphs (Absolute) 0.60 (*)     Monos (Absolute) 1.43 (*)     All other components within normal limits   COMP METABOLIC PANEL - Abnormal; Notable for the following:     Sodium 134 (*)     AST(SGOT) 144 (*)     ALT(SGPT) 106 (*)     All other components within normal limits   INFLUENZA A/B BY PCR - Abnormal; Notable for the following:     Influenza virus A RNA POSITIVE (*)     All other  "components within normal limits   BLOOD CULTURE    Narrative:     Per Hospital Policy: Only change Specimen Src: to \"Line\" if  specified by physician order.   BLOOD CULTURE    Narrative:     Per Hospital Policy: Only change Specimen Src: to \"Line\" if  specified by physician order.   ESTIMATED GFR   PROCALCITONIN   LACTIC ACID   URINALYSIS   URINE CULTURE(NEW)   HEPATITIS PANEL ACUTE(4 COMPONENTS)   DIAGNOSTIC ALCOHOL   ACETAMINOPHEN   All labs reviewed by me.    EKG Interpretation:  Results for orders placed or performed during the hospital encounter of 19   EKG   Result Value Ref Range    Report       [facility] Emergency Dept.    Test Date:  2019  Pt Name:    OMEGA PETERS                 Department: ER  MRN:        [id number]                      Room:  Gender:     Male                         Technician: [id number]  :        1965                   Requested By:ER TRIAGE PROTOCOL  Order #:    [id number]                    Reading MD:    Measurements  Intervals                                Axis  Rate:       103                          P:          33  CA:         148                          QRS:        -62  QRSD:       88                           T:          62  QT:         320  QTc:        419    Interpretive Statements  SINUS TACHYCARDIA  LEFT ANTERIOR FASCICULAR BLOCK  LATE PRECORDIAL R/S TRANSITION  No previous ECG available for comparison       RADIOLOGY  DX-SHOULDER 2+ LEFT   Final Result      1.  Mild degenerative change of LEFT shoulder.   2.  No fracture or dislocation.      DX-CHEST-PORTABLE (1 VIEW)   Final Result      No acute cardiac or pulmonary abnormalities are identified.      US-RUQ    (Results Pending)   The radiologist's interpretation of all radiological studies have been reviewed by me.    COURSE & MEDICAL DECISION MAKING  Nursing notes, VS, PMSFHx reviewed in chart.    1:28 PM Patient seen and examined at bedside.  I had a high suspicion for influenza a " versus pneumonia   and very low suspicion for pulmonary embolism or acute coronary syndrome.  Ordered for DX chest, EKG, and labs to evaluate. Patient was treated with Duo-neb, 5-325 mg Percocet, 600 mg ibuprofen, 650 mg Tylenol, 500 mg azithromycin, and 3 g Unasyn in  mL for his symptoms. He will be given 3,879 mL IV fluid secondary to tachycardia.    I did not perform p.o. challenge prior to IV fluids as this was an appropriate for sepsis management and we required a faster rate of administration    3:13 PM Reviewed patient's diagnostic results which showed positive influenza A.  Laboratory evaluation reveals no leukocytosis but there is mild shift, no evidence of electrolyte derangements does have elevated AST of 144 and ALT of 106.  Patient is mild drinker.  Lactate initially is 2.3.  I ordered 30 cc/kg for this patient who is dehydrated clinically and he is maintaining a good blood pressure.  He is bronchospastic in need of respiratory care and mild hypoxic status    3:22 PM Paged Dr. Simpson, Hospitalist.     3:37 PM Consulted Dr. Simpson, who agree to admit patient.     3:56 PM Patient reevaluated at bedside.  Patient is mildly improved and his wheezing.  He continues to be tachycardic after IV fluid administration. Discussed diagnostic results with patient and family member as shown above. They were informed of further plan of care including hospital admission for treatment. They understand and agree.     DISPOSITION:  Patient will be admitted to Dr. Simpson, Hospitalist, in guarded condition.    FINAL IMPRESSION  1. Influenza A    2. Sepsis, due to unspecified organism (HCC)    3. Contusion of left shoulder, initial encounter         Camryn ADHIKARI (Hal), am scribing for, and in the presence of, Neto Mcelroy M.D..  Electronically signed by: Camryn Mcmullen (Hal), 1/7/2019  Neto ADHIKARI M.D. personally performed the services described in this documentation, as scribed by Camryn Mcmullen in my presence,  and it is both accurate and complete. C.     The note accurately reflects work and decisions made by me.  Neto Mcelroy  1/7/2019  4:05 PM

## 2019-01-07 NOTE — ASSESSMENT & PLAN NOTE
This is sepsis (without associated acute organ dysfunction).   Fever. Mild lactic acidosis that normalized  Probably from URI  Cultures negative  Tamiflu   Augmentin and doxycyclione   Pro-calcitonin negative  We will consider discontinuing antibiotics

## 2019-01-07 NOTE — ED TRIAGE NOTES
Pt to triage .  Chief Complaint   Patient presents with   • Dizziness   • Fall     fell yesterday r/t dizziness pt states he woke up this way    • Shoulder Injury     left shoulder pain post fall    • Shortness of Breath   • Cough     x 1 wk    • Blurred Vision   • Weakness     generalized weakness

## 2019-01-08 ENCOUNTER — APPOINTMENT (OUTPATIENT)
Dept: RADIOLOGY | Facility: MEDICAL CENTER | Age: 54
DRG: 871 | End: 2019-01-08
Attending: INTERNAL MEDICINE
Payer: COMMERCIAL

## 2019-01-08 PROBLEM — J45.901 REACTIVE AIRWAY DISEASE WITH WHEEZING WITH ACUTE EXACERBATION: Status: ACTIVE | Noted: 2019-01-08

## 2019-01-08 PROBLEM — M62.82 NON-TRAUMATIC RHABDOMYOLYSIS: Status: ACTIVE | Noted: 2019-01-08

## 2019-01-08 LAB
ANION GAP SERPL CALC-SCNC: 9 MMOL/L (ref 0–11.9)
APPEARANCE UR: CLEAR
BACTERIA #/AREA URNS HPF: NEGATIVE /HPF
BILIRUB UR QL STRIP.AUTO: NEGATIVE
BUN SERPL-MCNC: 17 MG/DL (ref 8–22)
CALCIUM SERPL-MCNC: 8.4 MG/DL (ref 8.5–10.5)
CHLORIDE SERPL-SCNC: 104 MMOL/L (ref 96–112)
CK SERPL-CCNC: ABNORMAL U/L (ref 0–154)
CK SERPL-CCNC: ABNORMAL U/L (ref 0–154)
CO2 SERPL-SCNC: 22 MMOL/L (ref 20–33)
COLOR UR: YELLOW
CREAT SERPL-MCNC: 1.05 MG/DL (ref 0.5–1.4)
D DIMER PPP IA.FEU-MCNC: 0.51 UG/ML (FEU) (ref 0–0.5)
EPI CELLS #/AREA URNS HPF: NEGATIVE /HPF
ERYTHROCYTE [DISTWIDTH] IN BLOOD BY AUTOMATED COUNT: 46.4 FL (ref 35.9–50)
GLUCOSE SERPL-MCNC: 139 MG/DL (ref 65–99)
GLUCOSE UR STRIP.AUTO-MCNC: NEGATIVE MG/DL
HAV IGM SERPL QL IA: NEGATIVE
HBV CORE IGM SER QL: NEGATIVE
HBV SURFACE AG SER QL: NEGATIVE
HCT VFR BLD AUTO: 48.6 % (ref 42–52)
HCV AB SER QL: NEGATIVE
HGB BLD-MCNC: 15.9 G/DL (ref 14–18)
HYALINE CASTS #/AREA URNS LPF: ABNORMAL /LPF
KETONES UR STRIP.AUTO-MCNC: NEGATIVE MG/DL
LEUKOCYTE ESTERASE UR QL STRIP.AUTO: NEGATIVE
MCH RBC QN AUTO: 28.4 PG (ref 27–33)
MCHC RBC AUTO-ENTMCNC: 32.7 G/DL (ref 33.7–35.3)
MCV RBC AUTO: 86.9 FL (ref 81.4–97.8)
MICRO URNS: ABNORMAL
NITRITE UR QL STRIP.AUTO: NEGATIVE
PH UR STRIP.AUTO: 6 [PH]
PLATELET # BLD AUTO: 211 K/UL (ref 164–446)
PMV BLD AUTO: 10.8 FL (ref 9–12.9)
POTASSIUM SERPL-SCNC: 4.1 MMOL/L (ref 3.6–5.5)
PROT UR QL STRIP: NEGATIVE MG/DL
RBC # BLD AUTO: 5.59 M/UL (ref 4.7–6.1)
RBC # URNS HPF: ABNORMAL /HPF
RBC UR QL AUTO: ABNORMAL
SODIUM SERPL-SCNC: 135 MMOL/L (ref 135–145)
SP GR UR STRIP.AUTO: 1.01
UROBILINOGEN UR STRIP.AUTO-MCNC: 0.2 MG/DL
WBC # BLD AUTO: 5.4 K/UL (ref 4.8–10.8)
WBC #/AREA URNS HPF: ABNORMAL /HPF

## 2019-01-08 PROCEDURE — A9270 NON-COVERED ITEM OR SERVICE: HCPCS | Performed by: INTERNAL MEDICINE

## 2019-01-08 PROCEDURE — 36415 COLL VENOUS BLD VENIPUNCTURE: CPT

## 2019-01-08 PROCEDURE — 770021 HCHG ROOM/CARE - ISO PRIVATE

## 2019-01-08 PROCEDURE — 80048 BASIC METABOLIC PNL TOTAL CA: CPT

## 2019-01-08 PROCEDURE — 85379 FIBRIN DEGRADATION QUANT: CPT

## 2019-01-08 PROCEDURE — 700102 HCHG RX REV CODE 250 W/ 637 OVERRIDE(OP): Performed by: INTERNAL MEDICINE

## 2019-01-08 PROCEDURE — 99233 SBSQ HOSP IP/OBS HIGH 50: CPT | Performed by: INTERNAL MEDICINE

## 2019-01-08 PROCEDURE — 76705 ECHO EXAM OF ABDOMEN: CPT

## 2019-01-08 PROCEDURE — 700111 HCHG RX REV CODE 636 W/ 250 OVERRIDE (IP): Performed by: INTERNAL MEDICINE

## 2019-01-08 PROCEDURE — A9270 NON-COVERED ITEM OR SERVICE: HCPCS | Performed by: HOSPITALIST

## 2019-01-08 PROCEDURE — 82550 ASSAY OF CK (CPK): CPT

## 2019-01-08 PROCEDURE — 87086 URINE CULTURE/COLONY COUNT: CPT

## 2019-01-08 PROCEDURE — 700105 HCHG RX REV CODE 258: Performed by: HOSPITALIST

## 2019-01-08 PROCEDURE — 81001 URINALYSIS AUTO W/SCOPE: CPT

## 2019-01-08 PROCEDURE — 85027 COMPLETE CBC AUTOMATED: CPT

## 2019-01-08 PROCEDURE — 700102 HCHG RX REV CODE 250 W/ 637 OVERRIDE(OP): Performed by: HOSPITALIST

## 2019-01-08 RX ORDER — IPRATROPIUM BROMIDE AND ALBUTEROL SULFATE 2.5; .5 MG/3ML; MG/3ML
3 SOLUTION RESPIRATORY (INHALATION)
Status: DISCONTINUED | OUTPATIENT
Start: 2019-01-08 | End: 2019-01-10 | Stop reason: HOSPADM

## 2019-01-08 RX ORDER — BUDESONIDE AND FORMOTEROL FUMARATE DIHYDRATE 160; 4.5 UG/1; UG/1
2 AEROSOL RESPIRATORY (INHALATION) 2 TIMES DAILY
Status: DISCONTINUED | OUTPATIENT
Start: 2019-01-08 | End: 2019-01-10 | Stop reason: HOSPADM

## 2019-01-08 RX ORDER — BENZONATATE 100 MG/1
100 CAPSULE ORAL 3 TIMES DAILY PRN
Status: DISCONTINUED | OUTPATIENT
Start: 2019-01-08 | End: 2019-01-10 | Stop reason: HOSPADM

## 2019-01-08 RX ORDER — ACETAMINOPHEN 325 MG/1
650 TABLET ORAL EVERY 6 HOURS PRN
Status: DISCONTINUED | OUTPATIENT
Start: 2019-01-08 | End: 2019-01-10 | Stop reason: HOSPADM

## 2019-01-08 RX ORDER — SODIUM CHLORIDE 9 MG/ML
INJECTION, SOLUTION INTRAVENOUS CONTINUOUS
Status: DISCONTINUED | OUTPATIENT
Start: 2019-01-08 | End: 2019-01-10 | Stop reason: HOSPADM

## 2019-01-08 RX ORDER — SODIUM CHLORIDE 9 MG/ML
500 INJECTION, SOLUTION INTRAVENOUS ONCE
Status: COMPLETED | OUTPATIENT
Start: 2019-01-08 | End: 2019-01-08

## 2019-01-08 RX ADMIN — GUAIFENESIN 200 MG: 100 SOLUTION ORAL at 23:25

## 2019-01-08 RX ADMIN — SODIUM CHLORIDE: 9 INJECTION, SOLUTION INTRAVENOUS at 05:53

## 2019-01-08 RX ADMIN — PREDNISONE 40 MG: 20 TABLET ORAL at 05:52

## 2019-01-08 RX ADMIN — DOXYCYCLINE 100 MG: 100 TABLET ORAL at 05:52

## 2019-01-08 RX ADMIN — SODIUM CHLORIDE: 9 INJECTION, SOLUTION INTRAVENOUS at 23:28

## 2019-01-08 RX ADMIN — OSELTAMIVIR PHOSPHATE 75 MG: 75 CAPSULE ORAL at 05:52

## 2019-01-08 RX ADMIN — BENZONATATE 100 MG: 100 CAPSULE ORAL at 19:54

## 2019-01-08 RX ADMIN — SODIUM CHLORIDE 500 ML: 9 INJECTION, SOLUTION INTRAVENOUS at 02:21

## 2019-01-08 RX ADMIN — GUAIFENESIN 200 MG: 100 SOLUTION ORAL at 19:54

## 2019-01-08 RX ADMIN — SODIUM CHLORIDE: 9 INJECTION, SOLUTION INTRAVENOUS at 19:54

## 2019-01-08 RX ADMIN — GUAIFENESIN 200 MG: 100 SOLUTION ORAL at 17:41

## 2019-01-08 RX ADMIN — DOXYCYCLINE 100 MG: 100 TABLET ORAL at 17:41

## 2019-01-08 RX ADMIN — ACETAMINOPHEN 650 MG: 325 TABLET, FILM COATED ORAL at 02:18

## 2019-01-08 RX ADMIN — STANDARDIZED SENNA CONCENTRATE AND DOCUSATE SODIUM 2 TABLET: 8.6; 5 TABLET, FILM COATED ORAL at 05:52

## 2019-01-08 RX ADMIN — AMOXICILLIN AND CLAVULANATE POTASSIUM 1 TABLET: 875; 125 TABLET, FILM COATED ORAL at 05:52

## 2019-01-08 RX ADMIN — AMOXICILLIN AND CLAVULANATE POTASSIUM 1 TABLET: 875; 125 TABLET, FILM COATED ORAL at 17:41

## 2019-01-08 RX ADMIN — GUAIFENESIN 200 MG: 100 SOLUTION ORAL at 13:04

## 2019-01-08 RX ADMIN — BENZONATATE 100 MG: 100 CAPSULE ORAL at 23:25

## 2019-01-08 RX ADMIN — ENOXAPARIN SODIUM 40 MG: 100 INJECTION SUBCUTANEOUS at 05:53

## 2019-01-08 RX ADMIN — STANDARDIZED SENNA CONCENTRATE AND DOCUSATE SODIUM 2 TABLET: 8.6; 5 TABLET, FILM COATED ORAL at 17:42

## 2019-01-08 RX ADMIN — BENZONATATE 100 MG: 100 CAPSULE ORAL at 02:18

## 2019-01-08 RX ADMIN — BUDESONIDE AND FORMOTEROL FUMARATE DIHYDRATE 2 PUFF: 160; 4.5 AEROSOL RESPIRATORY (INHALATION) at 17:40

## 2019-01-08 RX ADMIN — OSELTAMIVIR PHOSPHATE 75 MG: 75 CAPSULE ORAL at 17:41

## 2019-01-08 RX ADMIN — Medication 1 CAPSULE: at 09:30

## 2019-01-08 ASSESSMENT — ENCOUNTER SYMPTOMS
NAUSEA: 1
CONSTIPATION: 0
VOMITING: 1
LOSS OF CONSCIOUSNESS: 0
PALPITATIONS: 0
WHEEZING: 0
SPEECH CHANGE: 0
FLANK PAIN: 0
COUGH: 1
SPUTUM PRODUCTION: 0
VOMITING: 0
BLOOD IN STOOL: 0
NAUSEA: 0
DOUBLE VISION: 0
BACK PAIN: 0
MEMORY LOSS: 0
MYALGIAS: 1
ABDOMINAL PAIN: 0
INSOMNIA: 0
SENSORY CHANGE: 0
SHORTNESS OF BREATH: 1
SEIZURES: 0
EYE PAIN: 0
FALLS: 1
NERVOUS/ANXIOUS: 0
EYE REDNESS: 0
DEPRESSION: 0
HEADACHES: 0
DIARRHEA: 0
DIZZINESS: 0
FEVER: 1
WHEEZING: 1
WEAKNESS: 1
TREMORS: 0
FOCAL WEAKNESS: 0
HEMOPTYSIS: 0
CHILLS: 0
FEVER: 0
DIAPHORESIS: 0
BLURRED VISION: 0

## 2019-01-08 ASSESSMENT — PATIENT HEALTH QUESTIONNAIRE - PHQ9
SUM OF ALL RESPONSES TO PHQ9 QUESTIONS 1 AND 2: 0
2. FEELING DOWN, DEPRESSED, IRRITABLE, OR HOPELESS: NOT AT ALL
1. LITTLE INTEREST OR PLEASURE IN DOING THINGS: NOT AT ALL

## 2019-01-08 ASSESSMENT — PAIN SCALES - GENERAL
PAINLEVEL_OUTOF10: 0
PAINLEVEL_OUTOF10: 0

## 2019-01-08 NOTE — PROGRESS NOTES
Ashley from Lab called with critical result of CPK at [id number]. Critical lab result read back to Ashley.   Dr. Mckeon notified of critical lab result at 0543..  Critical lab result read back by Dr. Mckeon. New orders received:  NS 250ml/hr. CPK daily. .

## 2019-01-08 NOTE — PROGRESS NOTES
· 2 RN skin check complete with MARY Barrett.  · Devices in place: PIV.  · Skin assessed under devices: Yes. TONEY. Skin is intact..  · Confirmed pressure ulcers found: N/A.  · New potential pressure ulcers noted: N/A  · The following interventions in place: skin assessment, skin care education provided.     Scar to left inner leg from varicose vein removal surgery. Heels are dry, calloused, pink and blanching.  Otherwise, skin is intact.

## 2019-01-08 NOTE — H&P
Hospital Medicine History & Physical Note    Date of Service  1/7/2019    Primary Care Physician  Cody Vega M.D.    Consultants      Code Status  full    Chief Complaint  Dizziness; Fall (fell yesterday r/t dizziness pt states he woke up this way ); Shoulder Injury (left shoulder pain post fall ); Shortness of Breath; Cough (x 1 wk ); Blurred Vision; and Weakness (generalized weakness )      History of Presenting Illness  53 y.o. male who presented 1/7/2019 with Dizziness; Fall (fell yesterday r/t dizziness pt states he woke up this way ); Shoulder Injury (left shoulder pain post fall ); Shortness of Breath; Cough (x 1 wk ); Blurred Vision; and Weakness (generalized weakness )  History of recurrent aspiration pneumonitis due to his GERD  Former smoker, no formal diagnosis of COPD  CLOTILDE with CPAP    Shortness of breath, wheezing, nausea with an episode of nonbloody vomiting, nonproductive coughing muscle aches since yesterday. Yesterday he felt dizzy from all the coughing as well and fell. Hit his shoulder. Today he felt he could not take a deep breath. He decided to come in.  At the ED, he is febrile and hemodynamically stable. Was mildly hypoxic.  CXR clear.  No leukocytosis. Slight lactic acidosis.  Influenza A positive.  When I saw him at ED, no acute distress. Coughing. Wheezing.          Review of Systems  Review of Systems   Constitutional: Positive for fever and malaise/fatigue. Negative for chills.   HENT: Negative for congestion, hearing loss and nosebleeds.    Eyes: Negative for pain and redness.   Respiratory: Positive for cough, shortness of breath and wheezing. Negative for hemoptysis.    Cardiovascular: Negative for chest pain and palpitations.   Gastrointestinal: Positive for nausea and vomiting. Negative for abdominal pain, blood in stool, constipation and diarrhea.   Genitourinary: Negative for dysuria, frequency and hematuria.   Musculoskeletal: Positive for falls, joint pain and myalgias.   Skin:  Negative for rash.   Neurological: Positive for weakness. Negative for dizziness, tremors, focal weakness, seizures, loss of consciousness and headaches.   Psychiatric/Behavioral: The patient is not nervous/anxious and does not have insomnia.    All other systems reviewed and are negative.      Past Medical History   has a past medical history of Headache(784.0); transient ischemic attack (TIA); Insomnia; Sleep apnea; and Varicose vein of leg.    Surgical History   has a past surgical history that includes other.     Family History  family history includes Cancer in his father; Diabetes in his father; Kidney Disease in his father; Other in his mother.     Social History   reports that he quit smoking about 7 years ago. His smoking use included Cigarettes. He started smoking about 39 years ago. He has a 32.00 pack-year smoking history. He has never used smokeless tobacco. He reports that he uses drugs, including Marijuana. He reports that he does not drink alcohol.    Allergies  No Known Allergies    Medications  Prior to Admission Medications   Prescriptions Last Dose Informant Patient Reported? Taking?   asa/apap/caffeine (EXCEDRIN) 250-250-65 MG Tab 1/6/2019 at [facility] Patient Yes Yes   Sig: Take 1 Tab by mouth every 6 hours as needed for Headache.      Facility-Administered Medications: None       Physical Exam  Temp:  [38.7 °C (101.6 °F)] 38.7 °C (101.6 °F)  Pulse:  [] 82  Resp:  [18-20] 20  BP: (127)/(54) 127/54    Physical Exam   Constitutional: He appears well-developed.   Malaised   HENT:   Head: Normocephalic and atraumatic.   Eyes: Conjunctivae are normal. No scleral icterus.   Neck: Normal range of motion. Neck supple.   Cardiovascular: Normal rate and regular rhythm.  Exam reveals no gallop and no friction rub.    No murmur heard.  Pulmonary/Chest: Effort normal. No respiratory distress. He has wheezes. He has no rales.   Coughing   Abdominal: Soft. Bowel sounds are normal. He exhibits no distension.  There is no tenderness. There is no rebound and no guarding.   Musculoskeletal: He exhibits tenderness (Mild shoulder tenderness, ROM intact). He exhibits no edema.   Neurological: He is alert.   Skin: Skin is warm.   Psychiatric: He has a normal mood and affect. His behavior is normal.       Laboratory:  Recent Labs      01/07/19   1350   WBC  8.3   RBC  5.75   HEMOGLOBIN  16.8   HEMATOCRIT  50.1   MCV  87.1   MCH  29.2   MCHC  33.5*   RDW  45.1   PLATELETCT  235   MPV  10.6     Recent Labs      01/07/19   1350   SODIUM  134*   POTASSIUM  4.1   CHLORIDE  100   CO2  24   GLUCOSE  97   BUN  16   CREATININE  1.22   CALCIUM  9.1     Recent Labs      01/07/19   1350   ALTSGPT  106*   ASTSGOT  144*   ALKPHOSPHAT  77   TBILIRUBIN  0.5   GLUCOSE  97                 No results for input(s): TROPONINI in the last 72 hours.    Urinalysis:    No results found     Imaging:  DX-SHOULDER 2+ LEFT   Final Result      1.  Mild degenerative change of LEFT shoulder.   2.  No fracture or dislocation.      DX-CHEST-PORTABLE (1 VIEW)   Final Result      No acute cardiac or pulmonary abnormalities are identified.      US-RUQ    (Results Pending)         Assessment/Plan:  I anticipate this patient will require at least two midnights for appropriate medical management, necessitating inpatient admission.    * Acute respiratory failure with hypoxia in the setting of influenza A (HCC)   Assessment & Plan    Resp and O2 per protocol.     Reactive airway disease with wheezing with acute exacerbation   Assessment & Plan    FOrmer Smoker   Resp and O2 per protocol  Cough medicines ordered  When better, Pulmonary referral, PFTs and sleep study in the outpatient     Transaminitis   Assessment & Plan    Liver vs muscle (has muscle aches) injury  Ordered CPK  Ordered RUQ U/S  Ordered tylenol level and alcohol level both came back negative  Ordered viral hepatitis panel     URI (upper respiratory infection)   Assessment & Plan    Augmentin  doxycycline  Resp and O2 perprotocol     Sepsis (HCC)   Assessment & Plan    This is sepsis (without associated acute organ dysfunction).   Fever. Mild lactic acidosis that normalized  Probably from URI  FOllow cultures and urinalysis  Tamiflu  Ordered Augmentin and doxycyclione fir now follow procalcitonin     CLOTILDE on CPAP- (present on admission)   Assessment & Plan    Ordered CPAP         VTE prophylaxis: Lovenox SQ  Reviewed vitals, labs, imaging, staff notes.  Discussed assessment and plan with Doni Valverde  Discussed with ED physician.

## 2019-01-08 NOTE — DIETARY
Nutrition Services:    Poor po on Nutrition Admit Screen. Pt is currently on a Regular diet and per chart pt PO % . Ht:  72 in, Wt: 129 kg, BMI 38    Consult RD as needed. RD will re-screen weekly.      RD available prn

## 2019-01-08 NOTE — ASSESSMENT & PLAN NOTE
FOrmer Smoker   Resp and O2 per protocol  Cough medicines ordered  When better, Pulmonary referral, PFTs and sleep study in the outpatient  Add Robitussin RTC

## 2019-01-08 NOTE — ASSESSMENT & PLAN NOTE
Liver vs muscle (has muscle aches) injury    -RUQ U/S-fatty liver  tylenol level and alcohol level both came back negative  - viral hepatitis panel negative  Denies history of alcohol use  Continue to monitor  Likely secondary to influenza

## 2019-01-08 NOTE — CARE PLAN
Problem: Safety  Goal: Will remain free from injury  Safety precautions and fall prevention in place.     Problem: Infection  Goal: Will remain free from infection  Pt on droplet precautions. Pt educated on prevention of spreading infection. Verbalized understanding. Mask and gloves done upon entering room. Pt with mask on for transfer to ultrasound.

## 2019-01-08 NOTE — ASSESSMENT & PLAN NOTE
Resp and O2 per protocol.  Ordered echo  Later, was 97% on room air. Doubt PE but ordered D-dimer, please follow.

## 2019-01-08 NOTE — CARE PLAN
Problem: Knowledge Deficit  Goal: Knowledge of the prescribed therapeutic regimen will improve  Outcome: PROGRESSING AS EXPECTED  Educated pt of all first dose medication and PRN medication tylenol benzonatate and IV fluids.

## 2019-01-08 NOTE — CARE PLAN
Problem: Venous Thromboembolism (VTW)/Deep Vein Thrombosis (DVT) Prevention:  Goal: Patient will participate in Venous Thrombosis (VTE)/Deep Vein Thrombosis (DVT)Prevention Measures  Outcome: PROGRESSING AS EXPECTED  Encouraged early ambulation. Lovenox in place

## 2019-01-08 NOTE — PROGRESS NOTES
Patient back from ultrasound. Scheduled meds administered per MAR. Pt denies any pain or discomfort at this time. A/ox4. NS running at 250cc/hr. Pt tolerating it well. PIV flushing well. Pt back to regular diet after ultrasound.   No change in condition noted.   Reminded to call for assistance. Personal belongings within reach. Reinforced fall precautions Call light and personal belongings within reach, bed kept low, treaded socks on. Assisted as necessary. Kept rested and comfortable at all times. Hourly rounding.

## 2019-01-08 NOTE — PROGRESS NOTES
[facility] Medicine Daily Progress Note    Date of Service  1/8/2019    Chief Complaint  53 y.o. male admitted 1/7/2019 with upper respiratory infection with influenza A and rhabdomyolysis admitted for shortness of breath and generalized weakness.    Hospital Course    53 y.o. male admitted 1/7/2019 with upper respiratory infection with influenza A and rhabdomyolysis admitted for shortness of breath and generalized weakness.      Interval Problem Update  Ongoing cough with minimal sputum production  Feels slightly better  Generalized fatigue    Consultants/Specialty  None    Code Status  Full    Disposition  To home in 2-3 days with clinical improvement    Review of Systems  Review of Systems   Constitutional: Negative for chills, diaphoresis, fever and malaise/fatigue.   HENT: Positive for congestion. Negative for hearing loss.    Eyes: Negative for blurred vision and double vision.   Respiratory: Positive for cough and shortness of breath. Negative for sputum production and wheezing.    Cardiovascular: Negative for chest pain, palpitations and leg swelling.   Gastrointestinal: Negative for abdominal pain, nausea and vomiting.   Genitourinary: Negative for dysuria and flank pain.   Musculoskeletal: Positive for myalgias. Negative for back pain and joint pain.   Neurological: Positive for weakness. Negative for dizziness, sensory change, speech change, focal weakness and headaches.   Psychiatric/Behavioral: Negative for depression and memory loss. The patient is not nervous/anxious.         Physical Exam  Temp:  [36.2 °C (97.1 °F)-38.6 °C (101.4 °F)] 36.7 °C (98 °F)  Pulse:  [64-96] 64  Resp:  [16-20] 16  BP: (104-147)/(58-85) 104/58    Physical Exam   Constitutional: He is oriented to person, place, and time. He appears well-nourished. No distress.   HENT:   Head: Normocephalic and atraumatic.   Nose: Nose normal.   Mouth/Throat: No oropharyngeal exudate.   Eyes: Pupils are equal, round, and reactive to light. EOM  are normal. Right eye exhibits no discharge. Left eye exhibits no discharge. No scleral icterus.   Neck: Neck supple. No thyromegaly present.   Cardiovascular: Normal rate and intact distal pulses.    No murmur heard.  Pulmonary/Chest: Effort normal. No respiratory distress. He has no wheezes. He has no rales. He exhibits no tenderness.   Decreased bilateral lower lobes   Abdominal: Soft. Bowel sounds are normal. He exhibits no distension. There is no tenderness.   Musculoskeletal: He exhibits no edema or tenderness.   Neurological: He is alert and oriented to person, place, and time. No cranial nerve deficit. Coordination normal.   Skin: Skin is warm and dry. No rash noted. He is not diaphoretic. No erythema.   Psychiatric: He has a normal mood and affect. His behavior is normal. Judgment and thought content normal.   Nursing note and vitals reviewed.      Fluids    Intake/Output Summary (Last 24 hours) at 01/08/19 1250  Last data filed at 01/08/19 1000   Gross per 24 hour   Intake             1348 ml   Output                0 ml   Net             1348 ml       Laboratory  Recent Labs      01/07/19   1350  01/08/19   0258   WBC  8.3  5.4   RBC  5.75  5.59   HEMOGLOBIN  16.8  15.9   HEMATOCRIT  50.1  48.6   MCV  87.1  86.9   MCH  29.2  28.4   MCHC  33.5*  32.7*   RDW  45.1  46.4   PLATELETCT  235  211   MPV  10.6  10.8     Recent Labs      01/07/19   1350  01/08/19   0258   SODIUM  134*  135   POTASSIUM  4.1  4.1   CHLORIDE  100  104   CO2  24  22   GLUCOSE  97  139*   BUN  16  17   CREATININE  1.22  1.05   CALCIUM  9.1  8.4*                   Imaging  US-RUQ   Final Result      1.  No evidence of gallstone or evidence of biliary ductal dilatation.      2.  The liver is echogenic consistent with fatty change versus hepatocellular dysfunction.      DX-SHOULDER 2+ LEFT   Final Result      1.  Mild degenerative change of LEFT shoulder.   2.  No fracture or dislocation.      DX-CHEST-PORTABLE (1 VIEW)   Final Result       No acute cardiac or pulmonary abnormalities are identified.      EC-ECHOCARDIOGRAM COMPLETE W/O CONT    (Results Pending)        Assessment/Plan  * Acute respiratory failure with hypoxia in the setting of influenza A (HCC)   Assessment & Plan    Resp and O2 per protocol.  Ordered echo  Later, was 97% on room air. Doubt PE but ordered D-dimer, please follow.     Non-traumatic rhabdomyolysis   Assessment & Plan    Continue IV fluid hydration  Monitor renal function  Secondary to influenza and sedentary state     Reactive airway disease with wheezing with acute exacerbation   Assessment & Plan    FOrmer Smoker   Resp and O2 per protocol  Cough medicines ordered  When better, Pulmonary referral, PFTs and sleep study in the outpatient  Add Robitussin RT     Transaminitis   Assessment & Plan    Liver vs muscle (has muscle aches) injury    -RUQ U/S-fatty liver  tylenol level and alcohol level both came back negative  - viral hepatitis panel negative     URI (upper respiratory infection)   Assessment & Plan    Augmentin doxycycline  Resp and O2 perprotocol     Sepsis (HCC)   Assessment & Plan    This is sepsis (without associated acute organ dysfunction).   Fever. Mild lactic acidosis that normalized  Probably from URI  Cultures negative  Tamiflu   Augmentin and doxycyclione   Pro-calcitonin negative  We will consider discontinuing antibiotics     CLOTILDE on CPAP- (present on admission)   Assessment & Plan    Ordered CPAP          VTE prophylaxis: Lovenox

## 2019-01-08 NOTE — PROGRESS NOTES
Pt arrived at floor after shift change. Pt is on 2 liter oxygen or comfort. Urine sample collected. Pt was coughing and had wheezing lung sounds. Respiratory contacted. Pt continued coughing. Pt complains he was cold. Room temperature turned up. Pt complains burning.   Vitals checked. Pt temp was 101.4. BP: 147/85. Pulse 96. Cold cloth and ice provided to decrease temp. On call physician Dr Linda drew. PRN tylenol received for fever. PRN Benzonatate received for coughing. 500ML IV NS Bolus received. All new medication administered.

## 2019-01-08 NOTE — RESPIRATORY CARE
COPD Education by COPD Clinical Educator  1/8/2019 at 9:44 PM by Tami Salvador    Patient interviewed by COPD education team.  Patient refused full COPD program at this time, but agreed to short intervention.  A comprehensive packet including information about COPD, treatments, and smoking cessation given.

## 2019-01-09 ENCOUNTER — APPOINTMENT (OUTPATIENT)
Dept: CARDIOLOGY | Facility: MEDICAL CENTER | Age: 54
DRG: 871 | End: 2019-01-09
Attending: INTERNAL MEDICINE
Payer: COMMERCIAL

## 2019-01-09 LAB
ALBUMIN SERPL BCP-MCNC: 3.1 G/DL (ref 3.2–4.9)
ALBUMIN/GLOB SERPL: 1 G/DL
ALP SERPL-CCNC: 67 U/L (ref 30–99)
ALT SERPL-CCNC: 108 U/L (ref 2–50)
ANION GAP SERPL CALC-SCNC: 8 MMOL/L (ref 0–11.9)
AST SERPL-CCNC: 133 U/L (ref 12–45)
BILIRUB SERPL-MCNC: 0.3 MG/DL (ref 0.1–1.5)
BUN SERPL-MCNC: 16 MG/DL (ref 8–22)
CALCIUM SERPL-MCNC: 8.2 MG/DL (ref 8.5–10.5)
CHLORIDE SERPL-SCNC: 108 MMOL/L (ref 96–112)
CK SERPL-CCNC: 7540 U/L (ref 0–154)
CO2 SERPL-SCNC: 22 MMOL/L (ref 20–33)
CREAT SERPL-MCNC: 0.99 MG/DL (ref 0.5–1.4)
GLOBULIN SER CALC-MCNC: 3 G/DL (ref 1.9–3.5)
GLUCOSE SERPL-MCNC: 105 MG/DL (ref 65–99)
LV EJECT FRACT  99904: 60
LV EJECT FRACT MOD 2C 99903: 58.39
LV EJECT FRACT MOD 4C 99902: 59.51
LV EJECT FRACT MOD BP 99901: 60.44
POTASSIUM SERPL-SCNC: 3.7 MMOL/L (ref 3.6–5.5)
PROT SERPL-MCNC: 6.1 G/DL (ref 6–8.2)
SODIUM SERPL-SCNC: 138 MMOL/L (ref 135–145)

## 2019-01-09 PROCEDURE — 700105 HCHG RX REV CODE 258: Performed by: HOSPITALIST

## 2019-01-09 PROCEDURE — A9270 NON-COVERED ITEM OR SERVICE: HCPCS | Performed by: HOSPITALIST

## 2019-01-09 PROCEDURE — 93306 TTE W/DOPPLER COMPLETE: CPT | Mod: 26 | Performed by: INTERNAL MEDICINE

## 2019-01-09 PROCEDURE — 93306 TTE W/DOPPLER COMPLETE: CPT

## 2019-01-09 PROCEDURE — 700111 HCHG RX REV CODE 636 W/ 250 OVERRIDE (IP): Performed by: INTERNAL MEDICINE

## 2019-01-09 PROCEDURE — 36415 COLL VENOUS BLD VENIPUNCTURE: CPT

## 2019-01-09 PROCEDURE — 80053 COMPREHEN METABOLIC PANEL: CPT

## 2019-01-09 PROCEDURE — 700102 HCHG RX REV CODE 250 W/ 637 OVERRIDE(OP): Performed by: HOSPITALIST

## 2019-01-09 PROCEDURE — 770021 HCHG ROOM/CARE - ISO PRIVATE

## 2019-01-09 PROCEDURE — 82550 ASSAY OF CK (CPK): CPT

## 2019-01-09 PROCEDURE — 99232 SBSQ HOSP IP/OBS MODERATE 35: CPT | Performed by: INTERNAL MEDICINE

## 2019-01-09 PROCEDURE — 700102 HCHG RX REV CODE 250 W/ 637 OVERRIDE(OP): Performed by: INTERNAL MEDICINE

## 2019-01-09 PROCEDURE — A9270 NON-COVERED ITEM OR SERVICE: HCPCS | Performed by: INTERNAL MEDICINE

## 2019-01-09 RX ORDER — CALCIUM CARBONATE 500 MG/1
500 TABLET, CHEWABLE ORAL 3 TIMES DAILY PRN
Status: DISCONTINUED | OUTPATIENT
Start: 2019-01-09 | End: 2019-01-10 | Stop reason: HOSPADM

## 2019-01-09 RX ADMIN — OSELTAMIVIR PHOSPHATE 75 MG: 75 CAPSULE ORAL at 17:46

## 2019-01-09 RX ADMIN — AMOXICILLIN AND CLAVULANATE POTASSIUM 1 TABLET: 875; 125 TABLET, FILM COATED ORAL at 05:52

## 2019-01-09 RX ADMIN — PREDNISONE 40 MG: 20 TABLET ORAL at 05:52

## 2019-01-09 RX ADMIN — DOXYCYCLINE 100 MG: 100 TABLET ORAL at 05:52

## 2019-01-09 RX ADMIN — GUAIFENESIN 200 MG: 100 SOLUTION ORAL at 03:45

## 2019-01-09 RX ADMIN — OSELTAMIVIR PHOSPHATE 75 MG: 75 CAPSULE ORAL at 05:53

## 2019-01-09 RX ADMIN — ENOXAPARIN SODIUM 40 MG: 100 INJECTION SUBCUTANEOUS at 05:52

## 2019-01-09 RX ADMIN — BENZONATATE 100 MG: 100 CAPSULE ORAL at 17:56

## 2019-01-09 RX ADMIN — GUAIFENESIN 200 MG: 100 SOLUTION ORAL at 17:45

## 2019-01-09 RX ADMIN — SODIUM CHLORIDE: 9 INJECTION, SOLUTION INTRAVENOUS at 18:00

## 2019-01-09 RX ADMIN — BUDESONIDE AND FORMOTEROL FUMARATE DIHYDRATE 2 PUFF: 160; 4.5 AEROSOL RESPIRATORY (INHALATION) at 05:52

## 2019-01-09 RX ADMIN — SODIUM CHLORIDE: 9 INJECTION, SOLUTION INTRAVENOUS at 02:54

## 2019-01-09 RX ADMIN — GUAIFENESIN 200 MG: 100 SOLUTION ORAL at 23:45

## 2019-01-09 RX ADMIN — Medication 1 CAPSULE: at 09:22

## 2019-01-09 RX ADMIN — GUAIFENESIN 200 MG: 100 SOLUTION ORAL at 21:07

## 2019-01-09 RX ADMIN — ANTACID TABLETS 500 MG: 500 TABLET, CHEWABLE ORAL at 21:08

## 2019-01-09 RX ADMIN — GUAIFENESIN 200 MG: 100 SOLUTION ORAL at 14:09

## 2019-01-09 RX ADMIN — BENZONATATE 100 MG: 100 CAPSULE ORAL at 05:52

## 2019-01-09 RX ADMIN — SODIUM CHLORIDE: 9 INJECTION, SOLUTION INTRAVENOUS at 07:08

## 2019-01-09 RX ADMIN — GUAIFENESIN 200 MG: 100 SOLUTION ORAL at 09:22

## 2019-01-09 RX ADMIN — BUDESONIDE AND FORMOTEROL FUMARATE DIHYDRATE 2 PUFF: 160; 4.5 AEROSOL RESPIRATORY (INHALATION) at 17:46

## 2019-01-09 ASSESSMENT — ENCOUNTER SYMPTOMS
MYALGIAS: 1
COUGH: 1
WEAKNESS: 1
CHILLS: 0
PALPITATIONS: 0
SPUTUM PRODUCTION: 0
FEVER: 0
INSOMNIA: 1
FLANK PAIN: 0
DIAPHORESIS: 0
HEADACHES: 0
NAUSEA: 0
BACK PAIN: 0
SHORTNESS OF BREATH: 0
NERVOUS/ANXIOUS: 0
SENSORY CHANGE: 0
ABDOMINAL PAIN: 0
DIZZINESS: 0
MEMORY LOSS: 0
FOCAL WEAKNESS: 0

## 2019-01-09 ASSESSMENT — PAIN SCALES - GENERAL
PAINLEVEL_OUTOF10: 0

## 2019-01-09 ASSESSMENT — PATIENT HEALTH QUESTIONNAIRE - PHQ9
SUM OF ALL RESPONSES TO PHQ9 QUESTIONS 1 AND 2: 0
1. LITTLE INTEREST OR PLEASURE IN DOING THINGS: NOT AT ALL
2. FEELING DOWN, DEPRESSED, IRRITABLE, OR HOPELESS: NOT AT ALL

## 2019-01-09 NOTE — PROGRESS NOTES
Report received by MARY Fisher. Assumed care of pt. Assessment complete. Pt A&Ox4, VSS. Pt in droplet precautions for influenza. Pt reports frequent coughing. Medicated per MAR. Plan of care discussed. Call light within reach, bed in lowest position, and pt has no further questions at this time.

## 2019-01-09 NOTE — CARE PLAN
Problem: Infection  Goal: Will remain free from infection  Outcome: PROGRESSING AS EXPECTED  Educated on importance of following droplet precautions, and performing hand washing to prevent the spread of infection.     Problem: Venous Thromboembolism (VTW)/Deep Vein Thrombosis (DVT) Prevention:  Goal: Patient will participate in Venous Thrombosis (VTE)/Deep Vein Thrombosis (DVT)Prevention Measures  Outcome: PROGRESSING AS EXPECTED   01/08/19 2000   Mechanical/VTE Prophylaxis   Mechanical Prophylaxis  SCDs, Sequential Compression Device   SCDs, Sequential Compression Device Refused   OTHER   Risk Assessment Score 1   VTE RISK Moderate   Pharmacologic Prophylaxis Used LMWH: Enoxaparin(Lovenox)

## 2019-01-09 NOTE — PROGRESS NOTES
[facility] Medicine Daily Progress Note    Date of Service  1/9/2019    Chief Complaint  53 y.o. male admitted 1/7/2019 with upper respiratory infection with influenza A and rhabdomyolysis admitted for shortness of breath and generalized weakness.    Hospital Course    53 y.o. male admitted 1/7/2019 with upper respiratory infection with influenza A and rhabdomyolysis admitted for shortness of breath and generalized weakness.      Interval Problem Update  Improved shortness of breath  Feels better  States that he was coughing all night, and did not sleep while  Denies abdominal pain  Denies any history of alcohol use    Consultants/Specialty  None    Code Status  Full    Disposition  To home in 2-3 days with clinical improvement    Review of Systems  Review of Systems   Constitutional: Negative for chills, diaphoresis, fever and malaise/fatigue.   HENT: Negative for congestion and hearing loss.    Respiratory: Positive for cough. Negative for sputum production and shortness of breath.    Cardiovascular: Negative for chest pain, palpitations and leg swelling.   Gastrointestinal: Negative for abdominal pain and nausea.   Genitourinary: Negative for dysuria and flank pain.   Musculoskeletal: Positive for myalgias. Negative for back pain and joint pain.   Neurological: Positive for weakness. Negative for dizziness, sensory change, focal weakness and headaches.   Psychiatric/Behavioral: Negative for memory loss. The patient has insomnia. The patient is not nervous/anxious.         Physical Exam  Temp:  [36.2 °C (97.1 °F)-36.9 °C (98.4 °F)] 36.4 °C (97.6 °F)  Pulse:  [55-86] 55  Resp:  [16-18] 18  BP: (102-137)/(57-80) 119/70    Physical Exam   Constitutional: He is oriented to person, place, and time. He appears well-nourished. No distress.   HENT:   Head: Normocephalic and atraumatic.   Nose: Nose normal.   Mouth/Throat: No oropharyngeal exudate.   Eyes: Pupils are equal, round, and reactive to light. EOM are normal. No  scleral icterus.   Neck: Neck supple. No thyromegaly present.   Cardiovascular: Normal rate and intact distal pulses.    No murmur heard.  Pulmonary/Chest: Effort normal. No respiratory distress. He exhibits no tenderness.   Decreased bilateral lower lobes   Abdominal: Soft. Bowel sounds are normal. He exhibits no distension and no mass.   Musculoskeletal: He exhibits no edema or tenderness.   Neurological: He is alert and oriented to person, place, and time. No cranial nerve deficit.   Skin: Skin is warm and dry. No erythema. No pallor.   Psychiatric: He has a normal mood and affect. His behavior is normal. Judgment and thought content normal.   Nursing note and vitals reviewed.      Fluids    Intake/Output Summary (Last 24 hours) at 01/09/19 1103  Last data filed at 01/09/19 0708   Gross per 24 hour   Intake             3480 ml   Output                0 ml   Net             3480 ml       Laboratory  Recent Labs      01/07/19   1350  01/08/19   0258   WBC  8.3  5.4   RBC  5.75  5.59   HEMOGLOBIN  16.8  15.9   HEMATOCRIT  50.1  48.6   MCV  87.1  86.9   MCH  29.2  28.4   MCHC  33.5*  32.7*   RDW  45.1  46.4   PLATELETCT  235  211   MPV  10.6  10.8     Recent Labs      01/07/19   1350  01/08/19   0258  01/09/19   0159   SODIUM  134*  135  138   POTASSIUM  4.1  4.1  3.7   CHLORIDE  100  104  108   CO2  24  22  22   GLUCOSE  97  139*  105*   BUN  16  17  16   CREATININE  1.22  1.05  0.99   CALCIUM  9.1  8.4*  8.2*                   Imaging  US-RUQ   Final Result      1.  No evidence of gallstone or evidence of biliary ductal dilatation.      2.  The liver is echogenic consistent with fatty change versus hepatocellular dysfunction.      DX-SHOULDER 2+ LEFT   Final Result      1.  Mild degenerative change of LEFT shoulder.   2.  No fracture or dislocation.      DX-CHEST-PORTABLE (1 VIEW)   Final Result      No acute cardiac or pulmonary abnormalities are identified.      EC-ECHOCARDIOGRAM COMPLETE W/O CONT    (Results  Pending)        Assessment/Plan  * Acute respiratory failure with hypoxia in the setting of influenza A (HCC)   Assessment & Plan    Resp and O2 per protocol.  Ordered echo  Later, was 97% on room air. Doubt PE but ordered D-dimer, please follow.     Non-traumatic rhabdomyolysis   Assessment & Plan    Continue IV fluid hydration  Monitor renal function  Secondary to influenza and sedentary state     Reactive airway disease with wheezing with acute exacerbation   Assessment & Plan    FOrmer Smoker   Resp and O2 per protocol  Cough medicines ordered  When better, Pulmonary referral, PFTs and sleep study in the outpatient  Add Robitussin RTC     Transaminitis   Assessment & Plan    Liver vs muscle (has muscle aches) injury    -RUQ U/S-fatty liver  tylenol level and alcohol level both came back negative  - viral hepatitis panel negative  Denies history of alcohol use  Continue to monitor  Likely secondary to influenza     URI (upper respiratory infection)   Assessment & Plan    Augmentin doxycycline  Resp and O2 perprotocol     Sepsis (HCC)   Assessment & Plan    This is sepsis (without associated acute organ dysfunction).   Fever. Mild lactic acidosis that normalized  Probably from URI  Cultures negative  Tamiflu   Augmentin and doxycyclione   Pro-calcitonin negative  We will consider discontinuing antibiotics     CLOTILDE on CPAP- (present on admission)   Assessment & Plan    Ordered CPAP          VTE prophylaxis: Lovenox

## 2019-01-10 ENCOUNTER — PATIENT OUTREACH (OUTPATIENT)
Dept: HEALTH INFORMATION MANAGEMENT | Facility: OTHER | Age: 54
End: 2019-01-10

## 2019-01-10 VITALS
OXYGEN SATURATION: 91 % | RESPIRATION RATE: 18 BRPM | WEIGHT: 285 LBS | HEART RATE: 60 BPM | TEMPERATURE: 97.1 F | BODY MASS INDEX: 37.6 KG/M2 | DIASTOLIC BLOOD PRESSURE: 83 MMHG | SYSTOLIC BLOOD PRESSURE: 114 MMHG

## 2019-01-10 LAB
ALBUMIN SERPL BCP-MCNC: 3.5 G/DL (ref 3.2–4.9)
ALBUMIN/GLOB SERPL: 1.2 G/DL
ALP SERPL-CCNC: 69 U/L (ref 30–99)
ALT SERPL-CCNC: 116 U/L (ref 2–50)
ANION GAP SERPL CALC-SCNC: 7 MMOL/L (ref 0–11.9)
AST SERPL-CCNC: 104 U/L (ref 12–45)
BACTERIA UR CULT: NORMAL
BILIRUB SERPL-MCNC: 0.3 MG/DL (ref 0.1–1.5)
BUN SERPL-MCNC: 16 MG/DL (ref 8–22)
CALCIUM SERPL-MCNC: 8.6 MG/DL (ref 8.5–10.5)
CHLORIDE SERPL-SCNC: 105 MMOL/L (ref 96–112)
CK SERPL-CCNC: 3630 U/L (ref 0–154)
CO2 SERPL-SCNC: 25 MMOL/L (ref 20–33)
CREAT SERPL-MCNC: 0.97 MG/DL (ref 0.5–1.4)
GLOBULIN SER CALC-MCNC: 2.9 G/DL (ref 1.9–3.5)
GLUCOSE SERPL-MCNC: 102 MG/DL (ref 65–99)
POTASSIUM SERPL-SCNC: 3.5 MMOL/L (ref 3.6–5.5)
PROT SERPL-MCNC: 6.4 G/DL (ref 6–8.2)
SIGNIFICANT IND 70042: NORMAL
SITE SITE: NORMAL
SODIUM SERPL-SCNC: 137 MMOL/L (ref 135–145)
SOURCE SOURCE: NORMAL

## 2019-01-10 PROCEDURE — A9270 NON-COVERED ITEM OR SERVICE: HCPCS | Performed by: INTERNAL MEDICINE

## 2019-01-10 PROCEDURE — 36415 COLL VENOUS BLD VENIPUNCTURE: CPT

## 2019-01-10 PROCEDURE — 700111 HCHG RX REV CODE 636 W/ 250 OVERRIDE (IP): Performed by: INTERNAL MEDICINE

## 2019-01-10 PROCEDURE — 82550 ASSAY OF CK (CPK): CPT

## 2019-01-10 PROCEDURE — 700102 HCHG RX REV CODE 250 W/ 637 OVERRIDE(OP): Performed by: INTERNAL MEDICINE

## 2019-01-10 PROCEDURE — 99239 HOSP IP/OBS DSCHRG MGMT >30: CPT | Performed by: INTERNAL MEDICINE

## 2019-01-10 PROCEDURE — A9270 NON-COVERED ITEM OR SERVICE: HCPCS | Performed by: HOSPITALIST

## 2019-01-10 PROCEDURE — 700102 HCHG RX REV CODE 250 W/ 637 OVERRIDE(OP): Performed by: HOSPITALIST

## 2019-01-10 PROCEDURE — 700105 HCHG RX REV CODE 258: Performed by: HOSPITALIST

## 2019-01-10 PROCEDURE — 80053 COMPREHEN METABOLIC PANEL: CPT

## 2019-01-10 RX ORDER — OSELTAMIVIR PHOSPHATE 75 MG/1
75 CAPSULE ORAL EVERY 12 HOURS
Qty: 4 CAP | Refills: 0 | Status: SHIPPED | OUTPATIENT
Start: 2019-01-10 | End: 2019-01-12

## 2019-01-10 RX ORDER — PREDNISONE 20 MG/1
20 TABLET ORAL DAILY
Status: DISCONTINUED | OUTPATIENT
Start: 2019-01-11 | End: 2019-01-10 | Stop reason: HOSPADM

## 2019-01-10 RX ORDER — LACTOBACILLUS RHAMNOSUS GG 10B CELL
1 CAPSULE ORAL
Qty: 30 CAP | Refills: 0 | Status: SHIPPED | OUTPATIENT
Start: 2019-01-11 | End: 2019-09-16

## 2019-01-10 RX ORDER — BUDESONIDE AND FORMOTEROL FUMARATE DIHYDRATE 160; 4.5 UG/1; UG/1
2 AEROSOL RESPIRATORY (INHALATION) 2 TIMES DAILY
Qty: 1 INHALER | Refills: 1 | Status: SHIPPED | OUTPATIENT
Start: 2019-01-10 | End: 2019-09-16

## 2019-01-10 RX ORDER — BENZONATATE 100 MG/1
100 CAPSULE ORAL 3 TIMES DAILY PRN
Qty: 60 CAP | Refills: 1 | Status: SHIPPED | OUTPATIENT
Start: 2019-01-10 | End: 2019-01-21

## 2019-01-10 RX ORDER — PREDNISONE 20 MG/1
TABLET ORAL
Qty: 30 TAB | Refills: 0 | Status: SHIPPED | OUTPATIENT
Start: 2019-01-11 | End: 2019-09-16

## 2019-01-10 RX ADMIN — OSELTAMIVIR PHOSPHATE 75 MG: 75 CAPSULE ORAL at 04:34

## 2019-01-10 RX ADMIN — BUDESONIDE AND FORMOTEROL FUMARATE DIHYDRATE 2 PUFF: 160; 4.5 AEROSOL RESPIRATORY (INHALATION) at 04:34

## 2019-01-10 RX ADMIN — BENZONATATE 100 MG: 100 CAPSULE ORAL at 04:34

## 2019-01-10 RX ADMIN — PREDNISONE 40 MG: 20 TABLET ORAL at 04:33

## 2019-01-10 RX ADMIN — Medication 1 CAPSULE: at 08:16

## 2019-01-10 RX ADMIN — GUAIFENESIN 200 MG: 100 SOLUTION ORAL at 04:33

## 2019-01-10 RX ADMIN — SODIUM CHLORIDE: 9 INJECTION, SOLUTION INTRAVENOUS at 02:11

## 2019-01-10 RX ADMIN — SODIUM CHLORIDE: 9 INJECTION, SOLUTION INTRAVENOUS at 08:29

## 2019-01-10 RX ADMIN — ENOXAPARIN SODIUM 40 MG: 100 INJECTION SUBCUTANEOUS at 04:33

## 2019-01-10 RX ADMIN — GUAIFENESIN 200 MG: 100 SOLUTION ORAL at 08:16

## 2019-01-10 ASSESSMENT — PAIN SCALES - GENERAL
PAINLEVEL_OUTOF10: 0

## 2019-01-10 NOTE — PROGRESS NOTES
Notified Dr. Bueno for pt's K+= 3.5. No orders received at this time. Will pass along to day team to address.

## 2019-01-10 NOTE — CARE PLAN
Problem: Infection  Goal: Will remain free from infection  Outcome: PROGRESSING AS EXPECTED  Pt is in droplet precautions for influenza A. Wore appropriate PPE and implemented hand washing before and after each pt contact.

## 2019-01-10 NOTE — PROGRESS NOTES
Report received by MARY Martin. Assumed care of pt. Assessment complete. Pt A&Ox4, VSS. Pt in droplet precautions for influenza A. Plan of care discussed. Call light within reach, bed in lowest position, and pt has no further questions at this time.

## 2019-01-10 NOTE — DISCHARGE SUMMARY
Discharge Summary    CHIEF COMPLAINT ON ADMISSION  Chief Complaint   Patient presents with   • Dizziness   • Fall     fell yesterday r/t dizziness pt states he woke up this way    • Shoulder Injury     left shoulder pain post fall    • Shortness of Breath   • Cough     x 1 wk    • Blurred Vision   • Weakness     generalized weakness        Reason for Admission  Shoulder pain, Vomiting, Cough     Admission Date  1/7/2019    CODE STATUS  Full Code    HPI & HOSPITAL COURSE    53 y.o. male admitted 1/7/2019 with upper respiratory infection with influenza A and rhabdomyolysis admitted for shortness of breath and generalized weakness.  He was started on IV fluid hydration as well as Tamiflu.  He was also covered with IV antibiotics for community-acquired pneumonia.  Chest x-ray reviewed with no clear indications for underlying pneumonia.  Respiratory symptoms as well as sepsis appears to be secondary to influenza A.  He has significantly improved on aggressive IV fluid hydration.  He has not noted to have any renal compromise from rhabdomyolysis.  CPK has improved during this admission.  He is encouraged aggressive oral fluid intake to avoid renal impairment.  At this point patient's symptoms have nearly resolved.  He will be discharged home to complete course of Tamiflu.  Antibiotics have been discontinued.  Pro-calcitonin remain negative.    Therefore, he is discharged in good and stable condition to home with close outpatient follow-up.    The patient met 2-midnight criteria for an inpatient stay at the time of discharge.    Discharge Date  January 10, 2019    FOLLOW UP ITEMS POST DISCHARGE  Discharge to home  Follow-up with primary care provider/discharge clinic in 1 week  Tamiflu times 2 days    DISCHARGE DIAGNOSES  Principal Problem:    Acute respiratory failure with hypoxia in the setting of influenza A (HCC) POA: Unknown  Active Problems:    CLOTILDE on CPAP POA: Yes    Sepsis (HCC) POA: Unknown    URI (upper  respiratory infection) POA: Unknown    Transaminitis POA: Unknown    Reactive airway disease with wheezing with acute exacerbation POA: Unknown    Non-traumatic rhabdomyolysis POA: Unknown  Resolved Problems:    * No resolved hospital problems. *      FOLLOW UP  Future Appointments  Date Time Provider Department Center   1/21/2019 2:00 PM ALINA Valentine  [facility]     No follow-up provider specified.    MEDICATIONS ON DISCHARGE     Medication List      START taking these medications      Instructions   benzonatate 100 MG Caps  Commonly known as:  TESSALON   Take 1 Cap by mouth 3 times a day as needed for Cough.  Dose:  100 mg     budesonide-formoterol 160-4.5 MCG/ACT Aero  Commonly known as:  SYMBICORT   Inhale 2 Puffs by mouth 2 Times a Day.  Dose:  2 Puff     lactobacillus rhamnosus Caps capsule  Start taking on:  1/11/2019   Take 1 Cap by mouth every morning with breakfast.  Dose:  1 Cap     oseltamivir 75 MG Caps  Commonly known as:  TAMIFLU   Take 1 Cap by mouth every 12 hours for 2 days.  Dose:  75 mg     predniSONE 20 MG Tabs  Start taking on:  1/11/2019  Commonly known as:  DELTASONE   1 tab daily x 5 days        CONTINUE taking these medications      Instructions   asa/apap/caffeine 250-250-65 MG Tabs  Commonly known as:  EXCEDRIN   Take 1 Tab by mouth every 6 hours as needed for Headache.  Dose:  1 Tab            Allergies  No Known Allergies    DIET  Orders Placed This Encounter   Procedures   • Diet Order Regular     Standing Status:   Standing     Number of Occurrences:   1     Order Specific Question:   Diet:     Answer:   Regular [1]       ACTIVITY  As tolerated.  Weight bearing as tolerated    CONSULTATIONS  None    PROCEDURES  None    LABORATORY  Lab Results   Component Value Date    SODIUM 137 01/10/2019    POTASSIUM 3.5 (L) 01/10/2019    CHLORIDE 105 01/10/2019    CO2 25 01/10/2019    GLUCOSE 102 (H) 01/10/2019    BUN 16 01/10/2019    CREATININE 0.97 01/10/2019        Lab Results    Component Value Date    WBC 5.4 01/08/2019    HEMOGLOBIN 15.9 01/08/2019    HEMATOCRIT 48.6 01/08/2019    PLATELETCT 211 01/08/2019        Total time of the discharge process exceeds 45 minutes.

## 2019-01-10 NOTE — PROGRESS NOTES
Assumed care of Pt at shift change. Pt is A&Ox4. Pt denies pain. But reports mild chest irritation from coughing. Cough medication has been given. Call light with in reach. Traction socks on pt and bed in lowest position.

## 2019-01-10 NOTE — CARE PLAN
Problem: Safety  Goal: Will remain free from falls    Intervention: Assess risk factors for falls  Pt assessed for risk factors that contribute to falls. Pt is steady on his feet and does not need any assistance with ambulation.       Problem: Bowel/Gastric:  Goal: Will not experience complications related to bowel motility    Intervention: Assess baseline bowel pattern  Pt baseline bowel pattern assessed. He has had not change in his bowel pattern.

## 2019-01-10 NOTE — PROGRESS NOTES
Assumed care of Pt at shift change. Pt is A&Ox4. Pt denies pain. Call light with in reach. Traction socks on pt and bed in lowest position.

## 2019-01-10 NOTE — DISCHARGE INSTRUCTIONS
Discharge Instructions    Discharged to home by car with self. Discharged via walking, hospital escort: Refused.  Special equipment needed: Not Applicable    Be sure to schedule a follow-up appointment with your primary care doctor or any specialists as instructed.     Discharge Plan:   Diet Plan: Discussed  Activity Level: Discussed  Confirmed Follow up Appointment: Appointment Scheduled  Confirmed Symptoms Management: Discussed  Medication Reconciliation Updated: Yes  Influenza Vaccine Indication: Indicated: 9 to 64 years of age    I understand that a diet low in cholesterol, fat, and sodium is recommended for good health. Unless I have been given specific instructions below for another diet, I accept this instruction as my diet prescription.   Other diet: regular    Special Instructions: None    · Is patient discharged on Warfarin / Coumadin?   No     Depression / Suicide Risk    As you are discharged from this [facility] facility, it is important to learn how to keep safe from harming yourself.    Recognize the warning signs:  · Abrupt changes in personality, positive or negative- including increase in energy   · Giving away possessions  · Change in eating patterns- significant weight changes-  positive or negative  · Change in sleeping patterns- unable to sleep or sleeping all the time   · Unwillingness or inability to communicate  · Depression  · Unusual sadness, discouragement and loneliness  · Talk of wanting to die  · Neglect of personal appearance   · Rebelliousness- reckless behavior  · Withdrawal from people/activities they love  · Confusion- inability to concentrate     If you or a loved one observes any of these behaviors or has concerns about self-harm, here's what you can do:  · Talk about it- your feelings and reasons for harming yourself  · Remove any means that you might use to hurt yourself (examples: pills, rope, extension cords, firearm)  · Get professional help from the community (Mental  "Health, Substance Abuse, psychological counseling)  · Do not be alone:Call your Safe Contact- someone whom you trust who will be there for you.  · Call your local CRISIS HOTLINE 989-1937 or 312-074-9534  · Call your local Children's Mobile Crisis Response Team [place] (952) 482-2654 or www.[place]  · Call the toll free National Suicide Prevention Hotlines   · National Suicide Prevention Lifeline 149-677-UUSY (0017)  · National Hope Line Network 800-SUICIDE (429-5863)      Influenza, Adult  Influenza (“the flu\") is an infection in the lungs, nose, and throat (respiratory tract). It is caused by a virus. The flu causes many common cold symptoms, as well as a high fever and body aches. It can make you feel very sick.  The flu spreads easily from person to person (is contagious). Getting a flu shot (influenza vaccination) every year is the best way to prevent the flu.  Follow these instructions at home:  · Take over-the-counter and prescription medicines only as told by your doctor.  · Use a cool mist humidifier to add moisture (humidity) to the air in your home. This can make it easier to breathe.  · Rest as needed.  · Drink enough fluid to keep your pee (urine) clear or pale yellow.  · Cover your mouth and nose when you cough or sneeze.  · Wash your hands with soap and water often, especially after you cough or sneeze. If you cannot use soap and water, use hand .  · Stay home from work or school as told by your doctor. Unless you are visiting your doctor, try to avoid leaving home until your fever has been gone for 24 hours without the use of medicine.  · Keep all follow-up visits as told by your doctor. This is important.  How is this prevented?  · Getting a yearly (annual) flu shot is the best way to avoid getting the flu. You may get the flu shot in late summer, fall, or winter. Ask your doctor when you should get your flu shot.  · Wash your hands often or use hand  often.  · Avoid " contact with people who are sick during cold and flu season.  · Eat healthy foods.  · Drink plenty of fluids.  · Get enough sleep.  · Exercise regularly.  Contact a doctor if:  · You get new symptoms.  · You have:  ¨ Chest pain.  ¨ Watery poop (diarrhea).  ¨ A fever.  · Your cough gets worse.  · You start to have more mucus.  · You feel sick to your stomach (nauseous).  · You throw up (vomit).  Get help right away if:  · You start to be short of breath or have trouble breathing.  · Your skin or nails turn a bluish color.  · You have very bad pain or stiffness in your neck.  · You get a sudden headache.  · You get sudden pain in your face or ear.  · You cannot stop throwing up.  This information is not intended to replace advice given to you by your health care provider. Make sure you discuss any questions you have with your health care provider.  Document Released: 09/26/2009 Document Revised: 05/25/2017 Document Reviewed: 10/11/2016  [employer] Interactive Patient Education © 2017 [employer]

## 2019-01-10 NOTE — CARE PLAN
Problem: Venous Thromboembolism (VTW)/Deep Vein Thrombosis (DVT) Prevention:  Goal: Patient will participate in Venous Thrombosis (VTE)/Deep Vein Thrombosis (DVT)Prevention Measures  Outcome: PROGRESSING AS EXPECTED   01/08/19 2000 01/09/19 2000   Mechanical/VTE Prophylaxis   Mechanical Prophylaxis  --  SCDs, Sequential Compression Device   SCDs, Sequential Compression Device --  Refused   OTHER   Risk Assessment Score 1 --    VTE RISK Moderate --    Pharmacologic Prophylaxis Used --  LMWH: Enoxaparin(Lovenox)

## 2019-01-10 NOTE — CARE PLAN
Problem: Knowledge Deficit  Goal: Knowledge of disease process/condition, treatment plan, diagnostic tests, and medications will improve    Intervention: Assess knowledge level of disease process/condition, treatment plan, diagnostic tests, and medications  Pt updated on today's plan of care to discharge. Pt understand plan and states that he is ready to discharge.       Problem: Discharge Barriers/Planning  Goal: Patient's continuum of care needs will be met    Intervention: Assess potential discharge barriers on admission and throughout hospital stay  No potential discharge barriers present. Pt has access to his own car and is eager to go home.

## 2019-01-10 NOTE — PROGRESS NOTES
Pt left floor with all belongings in hand. Pt refuses escort and request that he walk himself out. Pt shown to the elevators by the RN. IV removed. AVS signed and in chart.

## 2019-01-11 ENCOUNTER — PATIENT OUTREACH (OUTPATIENT)
Dept: HEALTH INFORMATION MANAGEMENT | Facility: OTHER | Age: 54
End: 2019-01-11

## 2019-01-12 LAB
BACTERIA BLD CULT: NORMAL
BACTERIA BLD CULT: NORMAL
SIGNIFICANT IND 70042: NORMAL
SIGNIFICANT IND 70042: NORMAL
SITE SITE: NORMAL
SITE SITE: NORMAL
SOURCE SOURCE: NORMAL
SOURCE SOURCE: NORMAL

## 2019-01-16 NOTE — ADDENDUM NOTE
Encounter addended by: Ashley Torres on: 1/16/2019  1:30 PM<BR>    Actions taken: Pend clinical note

## 2019-01-16 NOTE — DOCUMENTATION QUERY
DOCUMENTATION QUERY    PROVIDERS: Please select “Cosign w/ note”to reply to query.    To better represent the severity of illness of your patient, please review the following information and exercise your independent professional judgment in responding to this query.     Acute Respiratory Failure in the setting of sepsis.  Based on your findings, can this be further specified?    {Please clarify     • (Acute organ dysfunction) is related to sepsis  • (Acute organ dysfunction) is not related to sepsis  • Other explanation of clinical findings (please document)  • Unable to determine        The medical record reflects the following:   Clinical Findings ARF, Sepsis   Treatment Bi-pap, IV fluids, Tamiflu, IV antibiotics   Risk Factors Heart failure, hypertension, Flu. CLOTILDE, RAD with acute exac, URI   Location within medical record  Discharge summary, History and Physical, Progress notes     Thank you,     MYRANDA Gusman, CCS

## 2019-01-18 NOTE — ADDENDUM NOTE
Encounter addended by: Ashley Torres on: 1/18/2019 11:38 AM<BR>    Actions taken: Sign clinical note

## 2019-01-21 ENCOUNTER — OFFICE VISIT (OUTPATIENT)
Dept: MEDICAL GROUP | Facility: MEDICAL CENTER | Age: 54
End: 2019-01-21
Payer: COMMERCIAL

## 2019-01-21 VITALS
BODY MASS INDEX: 38.57 KG/M2 | WEIGHT: 291 LBS | TEMPERATURE: 97.6 F | SYSTOLIC BLOOD PRESSURE: 120 MMHG | DIASTOLIC BLOOD PRESSURE: 88 MMHG | OXYGEN SATURATION: 95 % | HEIGHT: 73 IN | RESPIRATION RATE: 16 BRPM | HEART RATE: 65 BPM

## 2019-01-21 DIAGNOSIS — Z80.42 FAMILY HISTORY OF PROSTATE CANCER IN FATHER: ICD-10-CM

## 2019-01-21 DIAGNOSIS — N50.89 TESTICULAR NODULE: ICD-10-CM

## 2019-01-21 DIAGNOSIS — J96.01 ACUTE RESPIRATORY FAILURE WITH HYPOXIA (HCC): ICD-10-CM

## 2019-01-21 DIAGNOSIS — Z09 HOSPITAL DISCHARGE FOLLOW-UP: ICD-10-CM

## 2019-01-21 DIAGNOSIS — G47.33 OSA ON CPAP: ICD-10-CM

## 2019-01-21 DIAGNOSIS — Z83.3 FAMILY HISTORY OF DIABETES MELLITUS IN FATHER: ICD-10-CM

## 2019-01-21 DIAGNOSIS — Z12.11 SCREEN FOR COLON CANCER: ICD-10-CM

## 2019-01-21 DIAGNOSIS — R73.03 PREDIABETES: ICD-10-CM

## 2019-01-21 DIAGNOSIS — E78.2 MIXED HYPERLIPIDEMIA: ICD-10-CM

## 2019-01-21 DIAGNOSIS — R79.89 ELEVATED LFTS: ICD-10-CM

## 2019-01-21 DIAGNOSIS — Z00.00 ANNUAL PHYSICAL EXAM: ICD-10-CM

## 2019-01-21 PROCEDURE — 99214 OFFICE O/P EST MOD 30 MIN: CPT | Performed by: NURSE PRACTITIONER

## 2019-01-21 RX ORDER — LACTOBACILLUS RHAMNOSUS GG 10B CELL
CAPSULE ORAL
Refills: 0 | COMMUNITY
Start: 2019-01-10 | End: 2021-06-30

## 2019-01-21 NOTE — ASSESSMENT & PLAN NOTE
Noticed this about August 2017, had his PCP evaluate this and was thought to possibly be a hernia. Ultrasound was ordered, but patient's insurance changed and he was unable to get ultrasound.

## 2019-01-21 NOTE — ASSESSMENT & PLAN NOTE
Diagnosed 5-6 years ago. Has been using CPAP nightly for this but has been having daytime sleepiness and headaches again. He has not followed up with pulmonary for about 5-6 years as well.

## 2019-01-21 NOTE — ASSESSMENT & PLAN NOTE
Admitted to [facility] 1/7-1/10, DX influenza A with secondary PNA, sepsis, acute respiratory failure, and rhabdomyolysis. Treated with Tamiflu, IV abx, IV fluids. Labs normalized. Patient is feeling much better, still has a lingering cough and fatigue. He reports that he was feeling much more clear headed and less fatigued while receiving oxygen in the hospital. Since he has been home he has been using his CPAP but is feeling very foggy in the morning and through the day.

## 2019-01-22 NOTE — PROGRESS NOTES
Doni Valverde is a 53 y.o. male here to establish care and hospital follow up:    HPI:    Hospital discharge follow-up  Admitted to [facility] 1/7-1/10, DX influenza A with secondary PNA, sepsis, acute respiratory failure, and rhabdomyolysis. Treated with Tamiflu, IV abx, IV fluids. Labs normalized. Patient is feeling much better, still has a lingering cough and fatigue. He reports that he was feeling much more clear headed and less fatigued while receiving oxygen in the hospital. Since he has been home he has been using his CPAP but is feeling very foggy in the morning and through the day.     CLOTILDE on CPAP  Diagnosed 5-6 years ago. Has been using CPAP for this but has     Testicular nodule  Noticed this about August 2017, had his PCP evaluate this and was thought to possibly be a hernia. Ultrasound was ordered, but patient's insurance changed and he was unable to get ultrasound.     Current medicines (including changes today)  Current Outpatient Prescriptions   Medication Sig Dispense Refill   • Lactobacillus-Inulin ([employer] DIGESTIVE [place]) Cap TK 1 C PO QAM WITH BREAKFAST  0   • budesonide-formoterol (SYMBICORT) 160-4.5 MCG/ACT Aerosol Inhale 2 Puffs by mouth 2 Times a Day. 1 Inhaler 1   • lactobacillus rhamnosus (CULTURELLE) Cap capsule Take 1 Cap by mouth every morning with breakfast. 30 Cap 0   • predniSONE (DELTASONE) 20 MG Tab 1 tab daily x 5 days 30 Tab 0   • asa/apap/caffeine (EXCEDRIN) 250-250-65 MG Tab Take 1 Tab by mouth every 6 hours as needed for Headache.       No current facility-administered medications for this visit.      He  has a past medical history of Headache(784.0); transient ischemic attack (TIA); Insomnia; Sleep apnea; and Varicose vein of leg.  He  has a past surgical history that includes other.  Social History   Substance Use Topics   • Smoking status: Former Smoker     Packs/day: 1.00     Years: 32.00     Types: Cigarettes     Start date: 1/1/1980     Quit date: 1/1/2012   •  "Smokeless tobacco: Never Used   • Alcohol use No     Social History     Social History Narrative   • No narrative on file     Family History   Problem Relation Age of Onset   • Cancer Father         Prostate   • Diabetes Father    • Kidney Disease Father    • Other Mother         Hypoglycemia/Vein issues     Family Status   Relation Status   • Fa (Not Specified)   • Mo (Not Specified)         ROS  No chest pain, no abdominal pain, no rash.  Positive ROS as per HPI.  All other systems reviewed and are negative      Objective:     Blood pressure 120/88, pulse 65, temperature 36.4 °C (97.6 °F), temperature source Temporal, resp. rate 16, height 1.854 m (6' 0.99\"), weight (!) 132 kg (291 lb), SpO2 95 %. Body mass index is 38.4 kg/m².     Physical Exam:    Constitutional: Alert, no distress.  Skin: Warm, dry, good turgor, no rashes in visible areas.  Eye: Equal, round, conjunctiva clear, lids normal.  ENMT: Lips without lesions, good dentition  Neck: Trachea midline  Respiratory: Unlabored respiratory effort, lungs clear to auscultation, no wheezes, no ronchi.  Cardiovascular: Normal S1, S2, no murmur, no edema.  Psych: Alert and oriented x3, normal affect and mood.    Walking O2 sat 90-97.       Assessment and Plan:   The following treatment plan was discussed    1. Hospital discharge follow-up  Stable, improving.     2. CLOTILDE on CPAP  Unstable  Likely needs settings adjusted and may need nocturnal oxygen bleed into CPAP.  Overnight oxymetry ordered  Follow up with pulm to eval settings  - REFERRAL TO PULMONOLOGY    3. Acute respiratory failure with hypoxia in the setting of influenza A (HCC)  Stable  Follow up with Pulm  - REFERRAL TO PULMONOLOGY    4. Testicular nodule  Unstable  Check US  - [id number]; Future    5. Family history of diabetes mellitus in father  - HEMOGLOBIN A1C; Future    6. Family history of prostate cancer in father  Check PSA    7. Elevated LFTs  Recheck  - COMP METABOLIC PANEL; " Future    8. Prediabetes  - HEMOGLOBIN A1C; Future    9. Mixed hyperlipidemia  - Lipid Profile; Future    10. Screen for colon cancer  - REFERRAL TO GI FOR COLONOSCOPY      Records reviewed  Followup: Return in about 4 weeks (around 2/18/2019) for Review Labs, noc pulse ox, need for noc O2.    I have placed the below orders and discussed them with an approved delegating provider. The MA is performing the below orders under the direction of Dr. Monique

## 2019-01-23 NOTE — ADDENDUM NOTE
Encounter addended by: Marion Arrington D.O. on: 1/22/2019  5:12 PM<BR>    Actions taken: Cosign clinical note with attestation

## 2019-02-06 ENCOUNTER — HOSPITAL ENCOUNTER (OUTPATIENT)
Dept: RADIOLOGY | Facility: MEDICAL CENTER | Age: 54
End: 2019-02-06
Attending: NURSE PRACTITIONER
Payer: COMMERCIAL

## 2019-02-06 DIAGNOSIS — N50.89 TESTICULAR NODULE: ICD-10-CM

## 2019-02-06 PROCEDURE — 76870 US EXAM SCROTUM: CPT

## 2019-02-13 ENCOUNTER — TELEPHONE (OUTPATIENT)
Dept: MEDICAL GROUP | Facility: MEDICAL CENTER | Age: 54
End: 2019-02-13

## 2019-02-14 NOTE — TELEPHONE ENCOUNTER
----- Message from ALINA Valentine sent at 2/13/2019  4:25 PM PST -----  Please notify patient that his testicular ultrasound showed no masses. He did have a small  Varicocele on the left side with a small fluid collection. This is generally a benign finding and are just monitored. If they are causing significant pain then we can discuss treatment options. He also has a small right epididymal cyst. These also do no require treatment.     ALINA Valentine

## 2019-04-14 ENCOUNTER — OFFICE VISIT (OUTPATIENT)
Dept: URGENT CARE | Facility: CLINIC | Age: 54
End: 2019-04-14
Payer: COMMERCIAL

## 2019-04-14 VITALS
RESPIRATION RATE: 16 BRPM | DIASTOLIC BLOOD PRESSURE: 80 MMHG | BODY MASS INDEX: 35.39 KG/M2 | WEIGHT: 267 LBS | TEMPERATURE: 98 F | SYSTOLIC BLOOD PRESSURE: 112 MMHG | HEIGHT: 73 IN | OXYGEN SATURATION: 95 % | HEART RATE: 68 BPM

## 2019-04-14 DIAGNOSIS — J11.1 INFLUENZA-LIKE ILLNESS: ICD-10-CM

## 2019-04-14 DIAGNOSIS — J02.9 SORE THROAT: ICD-10-CM

## 2019-04-14 LAB
INT CON NEG: NORMAL
INT CON POS: NORMAL
S PYO AG THROAT QL: NEGATIVE

## 2019-04-14 PROCEDURE — 99214 OFFICE O/P EST MOD 30 MIN: CPT | Performed by: NURSE PRACTITIONER

## 2019-04-14 PROCEDURE — 87880 STREP A ASSAY W/OPTIC: CPT | Performed by: NURSE PRACTITIONER

## 2019-04-14 RX ORDER — OSELTAMIVIR PHOSPHATE 75 MG/1
75 CAPSULE ORAL 2 TIMES DAILY
Qty: 10 CAP | Refills: 0 | Status: SHIPPED | OUTPATIENT
Start: 2019-04-14 | End: 2019-12-23

## 2019-04-14 ASSESSMENT — ENCOUNTER SYMPTOMS
SHORTNESS OF BREATH: 0
MYALGIAS: 1
SPUTUM PRODUCTION: 1
FEVER: 0
SORE THROAT: 1
WHEEZING: 0
COUGH: 1

## 2019-04-14 NOTE — PROGRESS NOTES
Subjective:      Doni Valverde is a 53 y.o. male who presents with Cough (x 3 days, productive cough) and Sinus Problem (x 3 days, nasal congestion, stuffy and runny nose, very sore throat)            Cough   This is a new problem. Episode onset: pt reports he developed runny nose, ST, cough and chest congestion 3 days ago. Denies any fever.  The problem has been unchanged. The cough is productive of sputum. Associated symptoms include ear congestion, myalgias, nasal congestion and a sore throat. Pertinent negatives include no fever, shortness of breath or wheezing. Risk factors: pt was recently hospitalized 3 months ago for sepsis due to influenza. He is concerned his symptoms will develop into this. He has tried OTC cough suppressant and rest for the symptoms. The treatment provided no relief. His past medical history is significant for pneumonia. There is no history of asthma.       Review of Systems   Constitutional: Negative for fever.   HENT: Positive for congestion and sore throat.    Respiratory: Positive for cough and sputum production. Negative for shortness of breath and wheezing.    Musculoskeletal: Positive for myalgias.   All other systems reviewed and are negative.    Past Medical History:   Diagnosis Date   • Anxiety    • Arthritis    • Depression    • Headache(784.0)    • Hx of transient ischemic attack (TIA)    • Insomnia    • Sleep apnea    • Varicose vein of leg       Past Surgical History:   Procedure Laterality Date   • EYE SURGERY     • OTHER      Varicose vein stripping      Social History     Social History   • Marital status: Single     Spouse name: N/A   • Number of children: N/A   • Years of education: N/A     Occupational History   • Not on file.     Social History Main Topics   • Smoking status: Current Every Day Smoker     Packs/day: 1.00     Years: 32.00     Types: Cigarettes     Start date: 1/1/1980     Last attempt to quit: 2008   • Smokeless tobacco: Never Used   • Alcohol  "use No   • Drug use: No   • Sexual activity: Yes     Partners: Female     Other Topics Concern   • Not on file     Social History Narrative   • No narrative on file          Objective:     /80 (BP Location: Left arm, Patient Position: Sitting, BP Cuff Size: Large adult)   Pulse 68   Temp 36.7 °C (98 °F)   Resp 16   Ht 1.854 m (6' 0.99\")   Wt 121.1 kg (267 lb)   SpO2 95%   BMI 35.23 kg/m²      Physical Exam   Constitutional: He is oriented to person, place, and time. Vital signs are normal. He appears well-developed and well-nourished.   HENT:   Head: Normocephalic and atraumatic.   Right Ear: Tympanic membrane and external ear normal.   Left Ear: Tympanic membrane and external ear normal.   Nose: Mucosal edema and rhinorrhea present.   Mouth/Throat: Posterior oropharyngeal erythema present.   Eyes: Pupils are equal, round, and reactive to light. EOM are normal.   Neck: Normal range of motion.   Cardiovascular: Normal rate and regular rhythm.    Pulmonary/Chest: Effort normal and breath sounds normal.   Musculoskeletal: Normal range of motion.   Lymphadenopathy:        Head (right side): Submandibular adenopathy present.        Head (left side): Submandibular adenopathy present.   Neurological: He is alert and oriented to person, place, and time.   Skin: Skin is warm and dry. Capillary refill takes less than 2 seconds.   Psychiatric: He has a normal mood and affect. His speech is normal and behavior is normal. Thought content normal.   Vitals reviewed.              Assessment/Plan:     1. Sore throat  - POCT Rapid Strep A NEGATIVE    2. Influenza-like illness  - oseltamivir (TAMIFLU) 75 MG Cap; Take 1 Cap by mouth 2 times a day.  Dispense: 10 Cap; Refill: 0    Discussed with patient symptoms are viral in nature, there is low concern for any respiratory infection currently. Antibiotics are not advised at this time.  Provided patient with significant reassurance that his symptoms are viral at this " time  Will provide him with tamiflu in hopes his symptoms will rapidly improve  Encouraged rest, fluids and supportive care  Warm salt water gargles  Alternate tylenol and ibuprofen for pain  Soft foods and cool liquids  Throat lozenges as directed  Please follow up with PCP in 1-2 weeks for [facility]  Strict ER precautions for any acute decline in condition  Supportive care, differential diagnoses, and indications for immediate follow-up discussed with patient.    Pathogenesis of diagnosis discussed including typical length and natural progression.      Instructed to return to  or nearest emergency department if symptoms fail to improve, for any change in condition, further concerns, or new concerning symptoms.  Patient states understanding of the plan of care and discharge instructions.

## 2019-09-16 ENCOUNTER — OFFICE VISIT (OUTPATIENT)
Dept: MEDICAL GROUP | Facility: MEDICAL CENTER | Age: 54
End: 2019-09-16
Payer: COMMERCIAL

## 2019-09-16 VITALS
TEMPERATURE: 97.3 F | DIASTOLIC BLOOD PRESSURE: 76 MMHG | OXYGEN SATURATION: 97 % | HEART RATE: 67 BPM | WEIGHT: 265 LBS | SYSTOLIC BLOOD PRESSURE: 128 MMHG | BODY MASS INDEX: 35.12 KG/M2 | HEIGHT: 73 IN

## 2019-09-16 DIAGNOSIS — H53.9 VISION CHANGES: ICD-10-CM

## 2019-09-16 DIAGNOSIS — M54.50 CHRONIC LEFT-SIDED LOW BACK PAIN WITHOUT SCIATICA: ICD-10-CM

## 2019-09-16 DIAGNOSIS — G89.29 CHRONIC LEFT-SIDED LOW BACK PAIN WITHOUT SCIATICA: ICD-10-CM

## 2019-09-16 DIAGNOSIS — H53.002 LAZY EYE OF LEFT SIDE: ICD-10-CM

## 2019-09-16 DIAGNOSIS — E66.9 OBESITY (BMI 30-39.9): ICD-10-CM

## 2019-09-16 PROCEDURE — 99214 OFFICE O/P EST MOD 30 MIN: CPT | Performed by: NURSE PRACTITIONER

## 2019-09-16 RX ORDER — NAPROXEN 500 MG/1
500 TABLET ORAL 2 TIMES DAILY WITH MEALS
Qty: 28 TAB | Refills: 0 | Status: SHIPPED | OUTPATIENT
Start: 2019-09-16 | End: 2020-03-30

## 2019-09-16 NOTE — PROGRESS NOTES
"Subjective:   Doni Valverde is a 54 y.o. male here today for the following:    Chronic left-sided low back pain without sciatica  Ongoing for years. Has flared recently with job changed, needing to bend more. Has been taking Advil 400 mg before bed, also tried tylenol.    No numbness/tingling/pain down the leg, no lower extremity weakness, no bowel or bladder changes.     Has done PT in the past after an accident, nothing recent.        Current medicines (including changes today)  Current Outpatient Medications   Medication Sig Dispense Refill   • naproxen (NAPROSYN) 500 MG Tab Take 1 Tab by mouth 2 times a day, with meals. 28 Tab 0   • oseltamivir (TAMIFLU) 75 MG Cap Take 1 Cap by mouth 2 times a day. 10 Cap 0   • Lactobacillus-Inulin ([facility] DIGESTIVE [facility]) Cap TK 1 C PO QAM WITH BREAKFAST  0   • asa/apap/caffeine (EXCEDRIN) 250-250-65 MG Tab Take 1 Tab by mouth every 6 hours as needed for Headache.       No current facility-administered medications for this visit.      He  has a past medical history of Anxiety, Arthritis, Depression, Headache(784.0), transient ischemic attack (TIA), Insomnia, Sleep apnea, and Varicose vein of leg.    ROS   No chest pain, no shortness of breath, no abdominal pain  Positive ROS as per HPI.  All other systems reviewed and are negative.     Objective:     /76 (BP Location: Right arm, Patient Position: Sitting)   Pulse 67   Temp 36.3 °C (97.3 °F)   Ht 1.854 m (6' 1\")   Wt 120.2 kg (265 lb)   SpO2 97%  Body mass index is 34.96 kg/m².     Physical Exam:  Constitutional: Alert, no distress.  Skin: Warm, dry, good turgor, no rashes in visible areas.  Eye: Equal, round, conjunctiva clear, lids normal.  ENMT: Lips without lesions, good dentition  Neck: Trachea midline  Respiratory: Unlabored respiratory effort, lungs clear to auscultation, no wheezes, no ronchi.  Cardiovascular: Normal S1, S2, no murmur, no edema.  Psych: Alert and oriented x3, normal affect and " mood.      Assessment and Plan:   The following treatment plan was discussed    1. Chronic left-sided low back pain without sciatica  Unstable  [place] of naproxen for 1-2 weeks  Follow up with PT and chiropractic  Advised better ergonomics when lifting at work  - naproxen (NAPROSYN) 500 MG Tab; Take 1 Tab by mouth 2 times a day, with meals.  Dispense: 28 Tab; Refill: 0  - REFERRAL TO PHYSICAL THERAPY Reason for Therapy: Eval/Treat/Report    2. Vision changes  Unstable  Likely age related  Check labs ordered several months ago including A1C  - REFERRAL TO OPHTHALMOLOGY    3. Lazy eye of left side  - REFERRAL TO OPHTHALMOLOGY      Followup: Return for pending labs.    I have placed the below orders and discussed them with an approved delegating provider. The MA is performing the below orders under the direction of Dr. Monique

## 2019-09-16 NOTE — ASSESSMENT & PLAN NOTE
Ongoing for years. Has flared recently with job changed, needing to bend more. Has been taking Advil 400 mg before bed, also tried tylenol.    No numbness/tingling/pain down the leg, no lower extremity weakness, no bowel or bladder changes.     Has done PT in the past after an accident, nothing recent.

## 2019-12-23 ENCOUNTER — APPOINTMENT (OUTPATIENT)
Dept: RADIOLOGY | Facility: MEDICAL CENTER | Age: 54
End: 2019-12-23
Attending: EMERGENCY MEDICINE
Payer: COMMERCIAL

## 2019-12-23 ENCOUNTER — HOSPITAL ENCOUNTER (EMERGENCY)
Facility: MEDICAL CENTER | Age: 54
End: 2019-12-23
Attending: EMERGENCY MEDICINE
Payer: COMMERCIAL

## 2019-12-23 VITALS
SYSTOLIC BLOOD PRESSURE: 139 MMHG | OXYGEN SATURATION: 96 % | TEMPERATURE: 97.7 F | WEIGHT: 266.1 LBS | DIASTOLIC BLOOD PRESSURE: 90 MMHG | RESPIRATION RATE: 16 BRPM | HEIGHT: 73 IN | BODY MASS INDEX: 35.27 KG/M2 | HEART RATE: 75 BPM

## 2019-12-23 DIAGNOSIS — J20.9 ACUTE BRONCHITIS, UNSPECIFIED ORGANISM: ICD-10-CM

## 2019-12-23 DIAGNOSIS — J01.90 ACUTE SINUSITIS, RECURRENCE NOT SPECIFIED, UNSPECIFIED LOCATION: ICD-10-CM

## 2019-12-23 LAB
FLUAV RNA SPEC QL NAA+PROBE: NEGATIVE
FLUBV RNA SPEC QL NAA+PROBE: NEGATIVE

## 2019-12-23 PROCEDURE — 87502 INFLUENZA DNA AMP PROBE: CPT

## 2019-12-23 PROCEDURE — 71046 X-RAY EXAM CHEST 2 VIEWS: CPT

## 2019-12-23 PROCEDURE — 99284 EMERGENCY DEPT VISIT MOD MDM: CPT

## 2019-12-23 RX ORDER — AZITHROMYCIN 250 MG/1
500 TABLET, FILM COATED ORAL ONCE
Status: DISCONTINUED | OUTPATIENT
Start: 2019-12-23 | End: 2019-12-23 | Stop reason: HOSPADM

## 2019-12-23 RX ORDER — AZITHROMYCIN 250 MG/1
TABLET, FILM COATED ORAL
Qty: 6 TAB | Refills: 0 | Status: SHIPPED | OUTPATIENT
Start: 2019-12-23 | End: 2020-03-30

## 2019-12-23 SDOH — HEALTH STABILITY: MENTAL HEALTH: HOW OFTEN DO YOU HAVE A DRINK CONTAINING ALCOHOL?: 2-4 TIMES A MONTH

## 2019-12-23 ASSESSMENT — LIFESTYLE VARIABLES: DO YOU DRINK ALCOHOL: NO

## 2019-12-23 NOTE — ED TRIAGE NOTES
.  Chief Complaint   Patient presents with   • Cough   • Congestion     nasal iris.   • Body Aches     Ambulated to triage with above c/c. Flu symptoms X 4 days.

## 2019-12-23 NOTE — ED NOTES
Paged lab, flu to result shortly.  Pt resting comfortably, pending full results.  Pt denies needs ,will CTM.

## 2019-12-24 NOTE — ED PROVIDER NOTES
ED Provider Note    CHIEF COMPLAINT  Chief Complaint   Patient presents with   • Cough   • Congestion     nasal iris.   • Body Aches       HPI  Edarmond Valverde is a 54 y.o. male who presents to the emergency room today with can chest and sinus congestion cough body aches and fever/chills.  Patient symptoms started over the last 4 to 5 days.  Has history of pneumonia in the past.  No nausea no vomiting no chest pain or shortness of breath.  He has had productive cough with greenish phlegm worsening today prompting to come to emergency room today.    REVIEW OF SYSTEMS  See HPI for further details. All other systems are negative.      PAST MEDICAL HISTORY  Past Medical History:   Diagnosis Date   • Anxiety    • Arthritis    • Depression    • Headache(784.0)    • Hx of transient ischemic attack (TIA)    • Insomnia    • Sleep apnea    • Varicose vein of leg        FAMILY HISTORY  Family History   Problem Relation Age of Onset   • Cancer Father         Prostate   • Diabetes Father    • Kidney Disease Father    • Lung Disease Father    • Arthritis Father    • Hypertension Father    • Hyperlipidemia Father    • Alcohol/Drug Father    • Other Mother         Hypoglycemia/Vein issues   • Arthritis Mother    • Genetic Disorder Mother    • Hyperlipidemia Mother        SOCIAL HISTORY  Social History     Socioeconomic History   • Marital status: Single     Spouse name: Not on file   • Number of children: Not on file   • Years of education: Not on file   • Highest education level: Not on file   Occupational History   • Not on file   Social Needs   • Financial resource strain: Not on file   • Food insecurity:     Worry: Not on file     Inability: Not on file   • Transportation needs:     Medical: Not on file     Non-medical: Not on file   Tobacco Use   • Smoking status: Current Every Day Smoker     Packs/day: 1.00     Years: 32.00     Pack years: 32.00     Types: Cigarettes     Start date: 1/1/1980     Last attempt to quit: 2008  "    Years since quittin.9   • Smokeless tobacco: Never Used   Substance and Sexual Activity   • Alcohol use: Yes     Alcohol/week: 0.0 oz     Frequency: 2-4 times a month   • Drug use: No   • Sexual activity: Yes     Partners: Female   Lifestyle   • Physical activity:     Days per week: Not on file     Minutes per session: Not on file   • Stress: Not on file   Relationships   • Social connections:     Talks on phone: Not on file     Gets together: Not on file     Attends Hindu service: Not on file     Active member of club or organization: Not on file     Attends meetings of clubs or organizations: Not on file     Relationship status: Not on file   • Intimate partner violence:     Fear of current or ex partner: Not on file     Emotionally abused: Not on file     Physically abused: Not on file     Forced sexual activity: Not on file   Other Topics Concern   • Not on file   Social History Narrative   • Not on file       SURGICAL HISTORY  Past Surgical History:   Procedure Laterality Date   • EYE SURGERY     • OTHER      Varicose vein stripping       CURRENT MEDICATIONS  Home Medications     Reviewed by Ina Aguayo R.N. (Registered Nurse) on 19 at 1121  Med List Status: Complete   Medication Last Dose Status   asa/apap/caffeine (EXCEDRIN) 250-250-65 MG Tab  Active   Lactobacillus-Inulin ([employer] DIGESTIVE HEALTH) Cap  Active   naproxen (NAPROSYN) 500 MG Tab 2019 Active                ALLERGIES  No Known Allergies    PHYSICAL EXAM  VITAL SIGNS: /90   Pulse 75   Temp 36.5 °C (97.7 °F) (Temporal)   Resp 16   Ht 1.854 m (6' 1\")   Wt 120.7 kg (266 lb 1.5 oz)   SpO2 96%   BMI 35.11 kg/m²       Constitutional :  Well developed, Well nourished,  acute distress, Non-toxic appearance.   HENT: Tenderness over the sinuses and percussion pharynx without injection  Eyes: perrla  Neck: Normal range of motion, No tenderness, Supple, No stridor.   Lymphatic: Bilateral submandibular. "   Cardiovascular: Normal heart rate, Normal rhythm, No murmurs, No rubs, No gallops.   Thorax & Lungs: Clear to auscultation all lung fields  Skin: Warm, Dry, No erythema, No rash.   Extremities: Intact distal pulses, No edema, No tenderness, No cyanosis, No clubbing.       RADIOLOGY/PROCEDURES  DX-CHEST-2 VIEWS   Final Result         Diffuse interstitial prominence could relate to chronic change, viral infection or mild edema.            COURSE & MEDICAL DECISION MAKING  Pertinent Labs & Imaging studies reviewed. (See chart for details)  Influenza negative, patient's chest x-ray showed viral infection probably bronchitis though he does have sinus congestion and tenderness will be placed on antibiotics Zithromax given first dose in the emergency room placed on Hycet for cough he understands that this opiate at the can be addictive try alternatives first like over-the-counter cough syrup no drive or operate him machinery no use of alcohol [place]  reviewed.  Patient will follow-up with primary care physician over the next 72 hours return to persistent worsening symptoms.  Verbalizes understanding above instructions.    FINAL IMPRESSION  1.  Acute sinusitis  2.  Acute bronchitis  3.      Electronically signed by: Adria San, 12/23/2019

## 2020-03-30 ENCOUNTER — TELEPHONE (OUTPATIENT)
Dept: HEALTH INFORMATION MANAGEMENT | Facility: OTHER | Age: 55
End: 2020-03-30

## 2020-03-30 ENCOUNTER — OFFICE VISIT (OUTPATIENT)
Dept: URGENT CARE | Facility: CLINIC | Age: 55
End: 2020-03-30
Payer: COMMERCIAL

## 2020-03-30 VITALS
WEIGHT: 262 LBS | DIASTOLIC BLOOD PRESSURE: 64 MMHG | OXYGEN SATURATION: 95 % | HEIGHT: 73 IN | RESPIRATION RATE: 18 BRPM | SYSTOLIC BLOOD PRESSURE: 102 MMHG | HEART RATE: 72 BPM | TEMPERATURE: 98.4 F | BODY MASS INDEX: 34.72 KG/M2

## 2020-03-30 DIAGNOSIS — J11.1 INFLUENZA-LIKE ILLNESS: ICD-10-CM

## 2020-03-30 DIAGNOSIS — R06.02 SHORTNESS OF BREATH: ICD-10-CM

## 2020-03-30 LAB
FLUAV+FLUBV AG SPEC QL IA: NEGATIVE
INT CON NEG: NEGATIVE
INT CON NEG: NORMAL
INT CON POS: NORMAL
INT CON POS: POSITIVE
S PYO AG THROAT QL: NEGATIVE

## 2020-03-30 PROCEDURE — 87880 STREP A ASSAY W/OPTIC: CPT | Performed by: NURSE PRACTITIONER

## 2020-03-30 PROCEDURE — 99214 OFFICE O/P EST MOD 30 MIN: CPT | Performed by: NURSE PRACTITIONER

## 2020-03-30 PROCEDURE — 87804 INFLUENZA ASSAY W/OPTIC: CPT | Performed by: NURSE PRACTITIONER

## 2020-03-30 RX ORDER — ALBUTEROL SULFATE 90 UG/1
2 AEROSOL, METERED RESPIRATORY (INHALATION) EVERY 6 HOURS PRN
Qty: 8.5 G | Refills: 0 | Status: SHIPPED | OUTPATIENT
Start: 2020-03-30 | End: 2021-08-10

## 2020-03-30 ASSESSMENT — ENCOUNTER SYMPTOMS
COUGH: 1
MYALGIAS: 1
TROUBLE SWALLOWING: 0
DIZZINESS: 0
VOMITING: 0
ABDOMINAL PAIN: 0
SPUTUM PRODUCTION: 0
CHILLS: 0
EYE DISCHARGE: 0
HEMOPTYSIS: 0
EYE REDNESS: 0
FEVER: 0
SHORTNESS OF BREATH: 1
HOARSE VOICE: 0
NAUSEA: 0
STRIDOR: 0
DEPRESSION: 0
HEADACHES: 0
PALPITATIONS: 0
NECK PAIN: 0
DIARRHEA: 0
SORE THROAT: 1
SWOLLEN GLANDS: 0
WHEEZING: 0

## 2020-03-30 ASSESSMENT — FIBROSIS 4 INDEX: FIB4 SCORE: 2.43

## 2020-03-30 NOTE — TELEPHONE ENCOUNTER
"1. Caller Name: Edward Baron                        Call Back Number: [phone]  [facility] PCP or Specialty Provider: Yes Iris Gann        2.  Does patient have any active symptoms of respiratory illness (fever OR cough OR shortness of breath OR sore throat)? Yes, the patient reports the following respiratory symptoms: cough, shortness of breath, sore throat and body aches. With body aches and \"just wants to sleep\".  Denies fever or chills.  3.  Does patient have any comoribidities? COPD    4.  Has the patient traveled in the last 14 days OR had any known contact with someone who is suspected or confirmed to have COVID-19?  Yes,  Flew to  [place], [place] and [place] on [place]th then picked up a car and drove back to [place].  Pt reports staying masked and gloved while flying.  Patient states he had a cough for approximately 2 weeks prior to going to [place].    o.5. Disposition: Offered patient virtual visit to limit potential exposure to others; patient also given self care instructions.  Patient is waiting outside [facility] while calling Triage RN from his cell phone.  Patient was instructed to go to Urgent Care on [facility] due to SOB and history of COPD.  Patient is driving as we speak. Very limited reception.     Note routed to [facility] Provider: FYI only.   "

## 2020-03-30 NOTE — PROGRESS NOTES
Subjective:      Doni Valverde is a 54 y.o. male who presents with Pharyngitis (x3 days, sore throat and bodyaches and cough x 2 weeks)              Patient with recent travel history to [place] prior to symptom onset.  Patient also has two children with similar symptoms that he just brought home from [facility]. Isolation precautions used during office visit.     Pharyngitis    This is a new problem. The current episode started in the past 7 days. The problem has been gradually worsening. Neither side of throat is experiencing more pain than the other. There has been no fever. The pain is at a severity of 5/10. The pain is moderate. Associated symptoms include congestion, coughing and shortness of breath. Pertinent negatives include no abdominal pain, diarrhea, drooling, ear discharge, ear pain, headaches, hoarse voice, plugged ear sensation, neck pain, stridor, swollen glands, trouble swallowing or vomiting. Associated symptoms comments: Body aches and SOB. He has had no exposure to strep. He has tried nothing for the symptoms.       Review of Systems   Constitutional: Positive for malaise/fatigue. Negative for chills and fever.   HENT: Positive for congestion and sore throat. Negative for drooling, ear discharge, ear pain, hoarse voice and trouble swallowing.    Eyes: Negative for discharge and redness.   Respiratory: Positive for cough and shortness of breath. Negative for hemoptysis, sputum production, wheezing and stridor.    Cardiovascular: Negative for chest pain and palpitations.   Gastrointestinal: Negative for abdominal pain, diarrhea, nausea and vomiting.   Musculoskeletal: Positive for myalgias. Negative for joint pain and neck pain.   Skin: Negative for rash.   Neurological: Negative for dizziness and headaches.   Psychiatric/Behavioral: Negative for depression and suicidal ideas.   All other systems reviewed and are negative.    PMH:  has a past medical history of Anxiety, Arthritis,  "Depression, Headache(784.0), transient ischemic attack (TIA), Insomnia, Sleep apnea, and Varicose vein of leg.  MEDS:   Current Outpatient Medications:   •  albuterol 108 (90 Base) MCG/ACT Aero Soln inhalation aerosol, Inhale 2 Puffs by mouth every 6 hours as needed for Shortness of Breath., Disp: 8.5 g, Rfl: 0  •  Lactobacillus-Inulin ([employer] DIGESTIVE HEALTH) Cap, TK 1 C PO QAM WITH BREAKFAST, Disp: , Rfl: 0  •  asa/apap/caffeine (EXCEDRIN) 250-250-65 MG Tab, Take 1 Tab by mouth every 6 hours as needed for Headache., Disp: , Rfl:   ALLERGIES: No Known Allergies  SURGHX:   Past Surgical History:   Procedure Laterality Date   • EYE SURGERY     • OTHER      Varicose vein stripping     SOCHX:  reports that he quit smoking about 2 months ago. His smoking use included cigarettes. He has a 32.00 pack-year smoking history. He has never used smokeless tobacco. He reports previous alcohol use. He reports that he does not use drugs.  FH: Reviewed with patient, not pertinent to this visit.        Objective:     /64   Pulse 72   Temp 36.9 °C (98.4 °F) (Temporal)   Resp 18   Ht 1.854 m (6' 1\")   Wt 118.8 kg (262 lb)   SpO2 95%   BMI 34.57 kg/m²      Physical Exam  Vitals signs reviewed.   Constitutional:       General: He is not in acute distress.     Appearance: Normal appearance. He is not ill-appearing or toxic-appearing.   HENT:      Head: Normocephalic and atraumatic.      Right Ear: Tympanic membrane, ear canal and external ear normal.      Left Ear: Tympanic membrane, ear canal and external ear normal.      Nose: Congestion present.      Mouth/Throat:      Mouth: Mucous membranes are moist.      Pharynx: Posterior oropharyngeal erythema present. No oropharyngeal exudate.   Eyes:      Extraocular Movements: Extraocular movements intact.      Conjunctiva/sclera: Conjunctivae normal.   Neck:      Musculoskeletal: Normal range of motion.   Cardiovascular:      Rate and Rhythm: Normal rate and regular rhythm. "      Pulses: Normal pulses.      Heart sounds: Normal heart sounds. No murmur.   Pulmonary:      Effort: Pulmonary effort is normal. No respiratory distress.      Breath sounds: Normal breath sounds. No wheezing, rhonchi or rales.   Abdominal:      Palpations: Abdomen is soft.   Musculoskeletal: Normal range of motion.   Skin:     General: Skin is warm.      Capillary Refill: Capillary refill takes less than 2 seconds.   Neurological:      Mental Status: He is alert and oriented to person, place, and time.   Psychiatric:         Mood and Affect: Mood normal.         Behavior: Behavior normal.         Thought Content: Thought content normal.         Judgment: Judgment normal.       POCT Rapid Strep A: Negative  POCT Rapid Influenza A/B: Negative        Assessment/Plan:       1. Influenza-like illness  Discussed likely viral etiology, possibly influenza. Unable to test for COVID-19. Have discussed with patient that I can note rule out COVID today and patient advised to treat his symptoms as if he had COVID.  Discussed self-isolation and provide patient with handout. Vitals and exam unremarkable.  Low suspicion for pneumonia.  Discussed appropriate over-the-counter symptomatic medication, and when to return to clinic. Follow up for worsening or persistent SOB.  - POCT Rapid Strep A  - POCT Influenza A/B     2. Shortness of breath  Patient requesting refill of albuterol inhaler today.   - albuterol 108 (90 Base) MCG/ACT Aero Soln inhalation aerosol; Inhale 2 Puffs by mouth every 6 hours as needed for Shortness of Breath.  Dispense: 8.5 g; Refill: 0

## 2020-04-14 ENCOUNTER — OFFICE VISIT (OUTPATIENT)
Dept: URGENT CARE | Facility: CLINIC | Age: 55
End: 2020-04-14
Payer: COMMERCIAL

## 2020-04-14 VITALS
RESPIRATION RATE: 16 BRPM | WEIGHT: 272 LBS | BODY MASS INDEX: 36.05 KG/M2 | SYSTOLIC BLOOD PRESSURE: 130 MMHG | DIASTOLIC BLOOD PRESSURE: 86 MMHG | HEART RATE: 75 BPM | HEIGHT: 73 IN | OXYGEN SATURATION: 95 % | TEMPERATURE: 99 F

## 2020-04-14 DIAGNOSIS — J40 BRONCHITIS: ICD-10-CM

## 2020-04-14 PROCEDURE — 99214 OFFICE O/P EST MOD 30 MIN: CPT | Performed by: PHYSICIAN ASSISTANT

## 2020-04-14 RX ORDER — CODEINE PHOSPHATE AND GUAIFENESIN 10; 100 MG/5ML; MG/5ML
5 SOLUTION ORAL EVERY 4 HOURS PRN
Qty: 120 ML | Refills: 0 | Status: SHIPPED | OUTPATIENT
Start: 2020-04-14 | End: 2020-04-19

## 2020-04-14 RX ORDER — BENZONATATE 100 MG/1
100 CAPSULE ORAL 3 TIMES DAILY PRN
Qty: 60 CAP | Refills: 0 | Status: SHIPPED | OUTPATIENT
Start: 2020-04-14 | End: 2021-06-30

## 2020-04-14 ASSESSMENT — FIBROSIS 4 INDEX: FIB4 SCORE: 2.43

## 2020-04-14 ASSESSMENT — ENCOUNTER SYMPTOMS
CHILLS: 0
SHORTNESS OF BREATH: 1
COUGH: 1
FEVER: 0
SPUTUM PRODUCTION: 1
WHEEZING: 1

## 2020-04-15 NOTE — PROGRESS NOTES
Subjective:   Doni Valverde is a 54 y.o. male who presents today with   Chief Complaint   Patient presents with   • Cough     deep,productive cough-x1 month   • Shortness of Breath       Cough   This is a new problem. The current episode started more than 1 month ago. The problem has been unchanged. The cough is productive of sputum. Associated symptoms include shortness of breath and wheezing. Pertinent negatives include no chills or fever. He has tried OTC cough suppressant, ipratropium inhaler and a beta-agonist inhaler for the symptoms. The treatment provided mild relief. His past medical history is significant for bronchitis and environmental allergies.   Patient notes that he has similar occurrences almost annually around this time year.  He states that in the past he had acid reflux that he aspirated causing scar tissue in his lungs and now makes him more susceptible to infections.  He states the reflux symptoms have resolved since he was started on a CPAP.  Patient states all of his symptoms have gotten better since the last time he was seen except for the persistent cough.    Patient denies any recent contact with patients who have tested positive for COVID-19.    PMH:  has a past medical history of Anxiety, Arthritis, Depression, Headache(784.0), transient ischemic attack (TIA), Insomnia, Sleep apnea, and Varicose vein of leg.  MEDS:   Current Outpatient Medications:   •  guaifenesin-codeine (ROBITUSSIN AC) Solution oral solution, Take 5 mL by mouth every four hours as needed for Cough for up to 5 days., Disp: 120 mL, Rfl: 0  •  benzonatate (TESSALON) 100 MG Cap, Take 1 Cap by mouth 3 times a day as needed for Cough., Disp: 60 Cap, Rfl: 0  •  albuterol 108 (90 Base) MCG/ACT Aero Soln inhalation aerosol, Inhale 2 Puffs by mouth every 6 hours as needed for Shortness of Breath., Disp: 8.5 g, Rfl: 0  •  Lactobacillus-Inulin ([facility] DIGESTIVE [facility]) Cap, TK 1 C PO QAM WITH BREAKFAST, Disp: , Rfl:  "0  •  asa/apap/caffeine (EXCEDRIN) 250-250-65 MG Tab, Take 1 Tab by mouth every 6 hours as needed for Headache., Disp: , Rfl:   ALLERGIES: No Known Allergies  SURGHX:   Past Surgical History:   Procedure Laterality Date   • EYE SURGERY     • OTHER      Varicose vein stripping     SOCHX:  reports that he quit smoking about 3 months ago. His smoking use included cigarettes. He has a 32.00 pack-year smoking history. He has never used smokeless tobacco. He reports previous alcohol use. He reports that he does not use drugs.  FH: Reviewed with patient, not pertinent to this visit.       Review of Systems   Constitutional: Negative for chills and fever.   Respiratory: Positive for cough, sputum production, shortness of breath and wheezing.    Endo/Heme/Allergies: Positive for environmental allergies.   All other systems reviewed and are negative.       Objective:   /86 (BP Location: Left arm)   Pulse 75   Temp 37.2 °C (99 °F) (Temporal)   Resp 16   Ht 1.854 m (6' 1\")   Wt 123.4 kg (272 lb)   SpO2 95%   BMI 35.89 kg/m²   Physical Exam  Vitals signs and nursing note reviewed.   Constitutional:       General: He is not in acute distress.     Appearance: Normal appearance. He is well-developed and normal weight. He is not ill-appearing or toxic-appearing.   HENT:      Head: Normocephalic and atraumatic.      Right Ear: Hearing, tympanic membrane and ear canal normal.      Left Ear: Hearing, tympanic membrane and ear canal normal.      Mouth/Throat:      Mouth: Mucous membranes are moist.   Eyes:      Pupils: Pupils are equal, round, and reactive to light.   Cardiovascular:      Rate and Rhythm: Normal rate and regular rhythm.      Heart sounds: Normal heart sounds.   Pulmonary:      Effort: Pulmonary effort is normal. No respiratory distress.      Breath sounds: No stridor. Wheezing present. No rhonchi or rales.   Musculoskeletal:      Comments: Normal movement in all 4 extremities   Skin:     General: Skin is " warm and dry.   Neurological:      Mental Status: He is alert.      Coordination: Coordination normal.   Psychiatric:         Mood and Affect: Mood normal.           Assessment/Plan:   Assessment    1. Bronchitis  - guaifenesin-codeine (ROBITUSSIN AC) Solution oral solution; Take 5 mL by mouth every four hours as needed for Cough for up to 5 days.  Dispense: 120 mL; Refill: 0  - benzonatate (TESSALON) 100 MG Cap; Take 1 Cap by mouth 3 times a day as needed for Cough.  Dispense: 60 Cap; Refill: 0  Lingering cough most representative of bronchitis following viral illness a month ago.  Discussed potential need for x-ray with patient if his symptoms persist despite treatment today.  Continue use of previously prescribed inhaler.  Discussed with patient to only use cough syrup sparingly at night as needed not before driving or working.  Differential diagnosis, natural history, supportive care, and indications for immediate follow-up discussed.   Patient given instructions and understanding of medications and treatment.    If not improving in 3-5 days, F/U with PCP or return to  if symptoms worsen.    Patient agreeable to plan.      Please note that this dictation was created using voice recognition software. I have made every reasonable attempt to correct obvious errors, but I expect that there are errors of grammar and possibly content that I did not discover before finalizing the note.    Steven Moreno PA-C

## 2020-05-13 ENCOUNTER — APPOINTMENT (OUTPATIENT)
Dept: RADIOLOGY | Facility: IMAGING CENTER | Age: 55
End: 2020-05-13
Attending: NURSE PRACTITIONER
Payer: COMMERCIAL

## 2020-05-13 ENCOUNTER — OFFICE VISIT (OUTPATIENT)
Dept: URGENT CARE | Facility: CLINIC | Age: 55
End: 2020-05-13
Payer: COMMERCIAL

## 2020-05-13 VITALS
HEART RATE: 80 BPM | HEIGHT: 73 IN | SYSTOLIC BLOOD PRESSURE: 102 MMHG | RESPIRATION RATE: 14 BRPM | DIASTOLIC BLOOD PRESSURE: 64 MMHG | TEMPERATURE: 97.9 F | OXYGEN SATURATION: 95 % | BODY MASS INDEX: 35.78 KG/M2 | WEIGHT: 270 LBS

## 2020-05-13 DIAGNOSIS — R05.9 COUGH: ICD-10-CM

## 2020-05-13 DIAGNOSIS — J22 LOWER RESP. TRACT INFECTION: ICD-10-CM

## 2020-05-13 PROCEDURE — 99214 OFFICE O/P EST MOD 30 MIN: CPT | Performed by: NURSE PRACTITIONER

## 2020-05-13 PROCEDURE — 71046 X-RAY EXAM CHEST 2 VIEWS: CPT | Mod: TC | Performed by: NURSE PRACTITIONER

## 2020-05-13 RX ORDER — CODEINE PHOSPHATE/GUAIFENESIN 10-100MG/5
5 LIQUID (ML) ORAL
Qty: 35 ML | Refills: 0 | Status: SHIPPED | OUTPATIENT
Start: 2020-05-13 | End: 2020-05-20

## 2020-05-13 RX ORDER — DOXYCYCLINE HYCLATE 100 MG
100 TABLET ORAL 2 TIMES DAILY
Qty: 14 TAB | Refills: 0 | Status: SHIPPED | OUTPATIENT
Start: 2020-05-13 | End: 2020-05-20

## 2020-05-13 ASSESSMENT — ENCOUNTER SYMPTOMS
SHORTNESS OF BREATH: 1
STRIDOR: 0
VOMITING: 0
WHEEZING: 1
SPUTUM PRODUCTION: 1
FEVER: 0
PALPITATIONS: 0
NAUSEA: 0
COUGH: 1
EYE REDNESS: 0
CHILLS: 0
SORE THROAT: 0
EYE DISCHARGE: 0
HEADACHES: 0

## 2020-05-13 ASSESSMENT — FIBROSIS 4 INDEX: FIB4 SCORE: 2.43

## 2020-05-13 NOTE — PROGRESS NOTES
Subjective:   Doni Valverde is a 54 y.o. male who presents for Cough (x 2 months. Cough, chest congestion, headache.  Denies fever or chills.  He said he has scar tissue in lungs from acid reflux and had tested Negative for Covid 19 1 month ago.  )        Cough   This is a recurrent problem. The current episode started more than 1 month ago. The problem has been unchanged. The cough is productive of sputum. Associated symptoms include nasal congestion, postnasal drip, shortness of breath and wheezing. Pertinent negatives include no chest pain, chills, ear pain, eye redness, fever, headaches, rash or sore throat. The symptoms are aggravated by lying down. He has tried a beta-agonist inhaler and prescription cough suppressant for the symptoms. The treatment provided mild relief. His past medical history is significant for bronchitis, environmental allergies and pneumonia.    Patient was seen in  a month ago and treated for lingering cough from viral illness with cough medications. Xray was recommended if cough did not resolve.    Review of Systems   Constitutional: Negative for chills and fever.   HENT: Positive for congestion and postnasal drip. Negative for ear discharge, ear pain and sore throat.    Eyes: Negative for discharge and redness.   Respiratory: Positive for cough, sputum production, shortness of breath and wheezing. Negative for stridor.    Cardiovascular: Negative for chest pain and palpitations.   Gastrointestinal: Negative for nausea and vomiting.   Skin: Negative for itching and rash.   Neurological: Negative for headaches.   Endo/Heme/Allergies: Positive for environmental allergies.   All other systems reviewed and are negative.    PMH:  has a past medical history of Anxiety, Arthritis, Depression, Headache(784.0), transient ischemic attack (TIA), Insomnia, Sleep apnea, and Varicose vein of leg.  ALLERGIES: No Known Allergies    Patient's PMH, SocHx, SurgHx, FamHx, Drug allergies and  "medications reviewed.     Objective:   /64   Pulse 80   Temp 36.6 °C (97.9 °F) (Temporal)   Resp 14   Ht 1.854 m (6' 1\")   Wt 122.5 kg (270 lb)   SpO2 95%   BMI 35.62 kg/m²   Physical Exam  Vitals signs reviewed.   Constitutional:       Appearance: He is well-developed.   HENT:      Head: Normocephalic.      Right Ear: Hearing, tympanic membrane and ear canal normal. No middle ear effusion. Tympanic membrane is not perforated or erythematous.      Left Ear: Hearing, tympanic membrane and ear canal normal.  No middle ear effusion. Tympanic membrane is not perforated or erythematous.      Nose: Congestion present. No rhinorrhea.      Right Sinus: No maxillary sinus tenderness or frontal sinus tenderness.      Left Sinus: No maxillary sinus tenderness or frontal sinus tenderness.      Mouth/Throat:      Lips: Pink.      Mouth: Mucous membranes are moist.      Pharynx: Oropharynx is clear. Uvula midline. No oropharyngeal exudate, posterior oropharyngeal erythema or uvula swelling.      Tonsils: No tonsillar exudate.   Eyes:      General: Lids are normal.      Extraocular Movements: Extraocular movements intact.      Conjunctiva/sclera: Conjunctivae normal.      Pupils: Pupils are equal, round, and reactive to light.   Neck:      Musculoskeletal: Normal range of motion.      Thyroid: No thyromegaly.   Cardiovascular:      Rate and Rhythm: Normal rate and regular rhythm.      Heart sounds: Normal heart sounds.   Pulmonary:      Effort: Pulmonary effort is normal. No tachypnea, bradypnea, accessory muscle usage, prolonged expiration or respiratory distress.      Breath sounds: Examination of the right-lower field reveals decreased breath sounds. Examination of the left-lower field reveals decreased breath sounds. Decreased breath sounds present. No wheezing.   Lymphadenopathy:      Head:      Right side of head: No submandibular or tonsillar adenopathy.      Left side of head: No submandibular or tonsillar " "adenopathy.   Skin:     General: Skin is warm and dry.      Findings: No rash.   Neurological:      Mental Status: He is alert and oriented to person, place, and time.   Psychiatric:         Mood and Affect: Mood normal.         Speech: Speech normal.         Behavior: Behavior normal. Behavior is cooperative.         Thought Content: Thought content normal.         Judgment: Judgment normal.           Assessment/Plan:   Assessment    1. Cough  DX-CHEST-2 VIEWS    guaifenesin-codeine (TUSSI-ORGANIDIN NR) 100-10 MG/5ML syrup   2. Lower resp. tract infection  REFERRAL TO PULMONOLOGY    doxycycline (VIBRAMYCIN) 100 MG Tab     Xray:\".  Mildly prominent interstitium, likely chronic and concerning for interstitial lung disease.  2.  No lobar pneumonia or pneumothorax.\"    Due to persistent productive cough and no recent antibiotics, will cover for bacterial etiology at this time. Xray concerning for interstitial lung disease - referral to pulmonology placed for follow up.    I have reviewed patient's NarCheck Report and no increased patterns of abuse. I have discussed to not combine with any other sedating medications and to not drive or operate heavy machinery     Strict ER precautions discussed    Differential diagnosis, natural history, supportive care, and indications for immediate follow-up discussed.     **Please note that all invasive procedures during this visit were performed by myself and/or the Medical Assistant under the supervision of the PA or MD in office**      "

## 2020-10-02 NOTE — TELEPHONE ENCOUNTER
Denial faxed to pharmacy.   Encounter Date: 10/2/2020    SCRIBE #1 NOTE: I, Phoebe Osuna, am scribing for, and in the presence of,  Guy J. Lefort, MD. I have scribed the entire note.       History     Chief Complaint   Patient presents with    GI Bleeding     c/o vomiting black emesis and of black stool since last night. sent by Dr. Frank     Josefinaserafin Martin is a 47 y.o. female who  has a past medical history of CHF (congestive heart failure), Diabetes mellitus, Hypertension, MI (myocardial infarction), and Stroke.    The patient presents to the ED due to 2 episodes of dark, bloody stools (3:00 & 7:00). Her associated symptoms include mid abdominal pain right below navel and 1 episode of dark, coffee ground emesis. The patient denies taking Pepto-Bismol or Fe supplements, fever, or any other concerning symptoms.    The history is provided by the patient.     Review of patient's allergies indicates:  No Known Allergies  Past Medical History:   Diagnosis Date    CHF (congestive heart failure)     Diabetes mellitus     Hypertension     MI (myocardial infarction)     Stroke      Past Surgical History:   Procedure Laterality Date    CHOLECYSTECTOMY  2013    history of cholelithiasis    TUBAL LIGATION       Family History   Problem Relation Age of Onset    Hypertension Mother     Lung cancer Mother     No Known Problems Father     No Known Problems Sister     No Known Problems Daughter     Diabetes Son 14        Takes 2 insulins and metformin use to be bigger wally    Stroke Maternal Uncle 48    Anuerysm Maternal Grandmother     Breast cancer Maternal Grandmother     No Known Problems Sister     No Known Problems Daughter     No Known Problems Son     Breast cancer Maternal Aunt     Breast cancer Maternal Aunt      Social History     Tobacco Use    Smoking status: Current Every Day Smoker     Packs/day: 0.15     Years: 18.00     Pack years: 2.70     Types: Cigarettes    Smokeless tobacco: Never Used    Tobacco comment: 1  pack/week   Substance Use Topics    Alcohol use: Yes     Comment: social 1/month    Drug use: No     Review of Systems    Physical Exam     Initial Vitals   BP Pulse Resp Temp SpO2   10/02/20 1058 10/02/20 1058 10/02/20 1058 10/02/20 1058 10/02/20 1418   (!) 159/105 91 20 98.5 °F (36.9 °C) 100 %      MAP       --                Physical Exam    ED Course   Procedures  Labs Reviewed   CBC W/ AUTO DIFFERENTIAL - Abnormal; Notable for the following components:       Result Value    Mean Corpuscular Hemoglobin 31.3 (*)     All other components within normal limits   COMPREHENSIVE METABOLIC PANEL - Abnormal; Notable for the following components:    BUN, Bld 28 (*)     All other components within normal limits   PROTIME-INR   GROUP & RH   INDIRECT ANTIGLOBULIN TEST   TYPE & SCREEN          Imaging Results    None          Medical Decision Making:   History:   Old Medical Records: I decided to obtain old medical records.  Old Records Summarized: records from clinic visits.       <> Summary of Records: 09/01 - PT presented for annual exam with Dr. Frank (OB/GYN)  Differential Diagnosis:   Differential Diagnosis includes, but is not limited to:  Lower GI bleed (AVM, colitis, colon cancer, polyp, internal/external hemorrhoid, IBS, rectal injury/foreign body, chronic diarrhea, anal fissure), upper GI bleed (PUD, perforated ulcer, esophageal variceal bleed), Crohns disease, ulcerative colitis, chronic diarrhea, dietary intake    Clinical Tests:   Lab Tests: Reviewed and Ordered  ED Management:  Discussed with Dr. Hoskins, stable UGIB suspected, start PPI BID, will follow up in clinic next week, discussed ED return precautions with patient.                             Clinical Impression:     ICD-10-CM ICD-9-CM   1. Gastrointestinal hemorrhage, unspecified gastrointestinal hemorrhage type  K92.2 578.9                          ED Disposition Condition    Discharge Stable        ED Prescriptions     Medication Sig Dispense Start  Date End Date Auth. Provider    pantoprazole (PROTONIX) 40 MG tablet Take 1 tablet (40 mg total) by mouth 2 (two) times daily. 60 tablet 10/2/2020 10/2/2021 Guy J. Lefort, MD        Follow-up Information     Follow up With Specialties Details Why Contact Info    [facility]  If symptoms worsen or any other concerns [address] [id number] [phone]    Tho Hoskins MD Gastroenterology   [address]  [address]  [facility] [address]  [phone]                                Scribe Attestation I, Dr. Guy Lefort, personally performed the services described in this documentation. All medical record entries made by the scribe were at my direction and in my presence. I have reviewed the chart and agree that the record reflects my personal performance and is accurate and complete. Guy Lefort, MD.  6:57 PM 10/03/2020             Guy J. Lefort, MD  10/03/20 1857

## 2020-12-02 ENCOUNTER — APPOINTMENT (OUTPATIENT)
Dept: MEDICAL GROUP | Facility: MEDICAL CENTER | Age: 55
End: 2020-12-02
Payer: COMMERCIAL

## 2021-06-03 ENCOUNTER — HOSPITAL ENCOUNTER (EMERGENCY)
Facility: MEDICAL CENTER | Age: 56
End: 2021-06-04
Attending: EMERGENCY MEDICINE
Payer: COMMERCIAL

## 2021-06-03 DIAGNOSIS — J36 TONSILLAR ABSCESS: ICD-10-CM

## 2021-06-03 DIAGNOSIS — J03.90 TONSILLITIS: Primary | ICD-10-CM

## 2021-06-03 PROCEDURE — 99285 EMERGENCY DEPT VISIT HI MDM: CPT

## 2021-06-04 ENCOUNTER — APPOINTMENT (OUTPATIENT)
Dept: RADIOLOGY | Facility: MEDICAL CENTER | Age: 56
End: 2021-06-04
Attending: EMERGENCY MEDICINE
Payer: COMMERCIAL

## 2021-06-04 VITALS
OXYGEN SATURATION: 88 % | WEIGHT: 277.78 LBS | HEART RATE: 67 BPM | RESPIRATION RATE: 18 BRPM | TEMPERATURE: 97.6 F | SYSTOLIC BLOOD PRESSURE: 120 MMHG | HEIGHT: 73 IN | BODY MASS INDEX: 36.82 KG/M2 | DIASTOLIC BLOOD PRESSURE: 74 MMHG

## 2021-06-04 LAB
ALBUMIN SERPL BCP-MCNC: 4.1 G/DL (ref 3.2–4.9)
ALBUMIN/GLOB SERPL: 1.4 G/DL
ALP SERPL-CCNC: 98 U/L (ref 30–99)
ALT SERPL-CCNC: 36 U/L (ref 2–50)
ANION GAP SERPL CALC-SCNC: 10 MMOL/L (ref 7–16)
AST SERPL-CCNC: 18 U/L (ref 12–45)
BASOPHILS # BLD AUTO: 0.8 % (ref 0–1.8)
BASOPHILS # BLD: 0.09 K/UL (ref 0–0.12)
BILIRUB SERPL-MCNC: 0.2 MG/DL (ref 0.1–1.5)
BUN SERPL-MCNC: 24 MG/DL (ref 8–22)
CALCIUM SERPL-MCNC: 9.4 MG/DL (ref 8.5–10.5)
CHLORIDE SERPL-SCNC: 105 MMOL/L (ref 96–112)
CO2 SERPL-SCNC: 25 MMOL/L (ref 20–33)
CREAT SERPL-MCNC: 1 MG/DL (ref 0.5–1.4)
EOSINOPHIL # BLD AUTO: 0.35 K/UL (ref 0–0.51)
EOSINOPHIL NFR BLD: 3 % (ref 0–6.9)
ERYTHROCYTE [DISTWIDTH] IN BLOOD BY AUTOMATED COUNT: 46.3 FL (ref 35.9–50)
GLOBULIN SER CALC-MCNC: 3 G/DL (ref 1.9–3.5)
GLUCOSE SERPL-MCNC: 125 MG/DL (ref 65–99)
HCT VFR BLD AUTO: 52 % (ref 42–52)
HGB BLD-MCNC: 16.9 G/DL (ref 14–18)
IMM GRANULOCYTES # BLD AUTO: 0.05 K/UL (ref 0–0.11)
IMM GRANULOCYTES NFR BLD AUTO: 0.4 % (ref 0–0.9)
LYMPHOCYTES # BLD AUTO: 2.69 K/UL (ref 1–4.8)
LYMPHOCYTES NFR BLD: 23.4 % (ref 22–41)
MCH RBC QN AUTO: 28.8 PG (ref 27–33)
MCHC RBC AUTO-ENTMCNC: 32.5 G/DL (ref 33.7–35.3)
MCV RBC AUTO: 88.7 FL (ref 81.4–97.8)
MONOCYTES # BLD AUTO: 1.38 K/UL (ref 0–0.85)
MONOCYTES NFR BLD AUTO: 12 % (ref 0–13.4)
NEUTROPHILS # BLD AUTO: 6.93 K/UL (ref 1.82–7.42)
NEUTROPHILS NFR BLD: 60.4 % (ref 44–72)
NRBC # BLD AUTO: 0 K/UL
NRBC BLD-RTO: 0 /100 WBC
PLATELET # BLD AUTO: 248 K/UL (ref 164–446)
PMV BLD AUTO: 11.4 FL (ref 9–12.9)
POTASSIUM SERPL-SCNC: 4 MMOL/L (ref 3.6–5.5)
PROT SERPL-MCNC: 7.1 G/DL (ref 6–8.2)
RBC # BLD AUTO: 5.86 M/UL (ref 4.7–6.1)
SODIUM SERPL-SCNC: 140 MMOL/L (ref 135–145)
WBC # BLD AUTO: 11.5 K/UL (ref 4.8–10.8)

## 2021-06-04 PROCEDURE — 96375 TX/PRO/DX INJ NEW DRUG ADDON: CPT

## 2021-06-04 PROCEDURE — 700111 HCHG RX REV CODE 636 W/ 250 OVERRIDE (IP): Performed by: EMERGENCY MEDICINE

## 2021-06-04 PROCEDURE — 70491 CT SOFT TISSUE NECK W/DYE: CPT

## 2021-06-04 PROCEDURE — 96374 THER/PROPH/DIAG INJ IV PUSH: CPT

## 2021-06-04 PROCEDURE — 36415 COLL VENOUS BLD VENIPUNCTURE: CPT

## 2021-06-04 PROCEDURE — 85025 COMPLETE CBC W/AUTO DIFF WBC: CPT

## 2021-06-04 PROCEDURE — 80053 COMPREHEN METABOLIC PANEL: CPT

## 2021-06-04 PROCEDURE — 700117 HCHG RX CONTRAST REV CODE 255: Performed by: EMERGENCY MEDICINE

## 2021-06-04 PROCEDURE — A9270 NON-COVERED ITEM OR SERVICE: HCPCS | Performed by: EMERGENCY MEDICINE

## 2021-06-04 PROCEDURE — 700102 HCHG RX REV CODE 250 W/ 637 OVERRIDE(OP): Performed by: EMERGENCY MEDICINE

## 2021-06-04 RX ORDER — CLINDAMYCIN HYDROCHLORIDE 300 MG/1
300 CAPSULE ORAL 3 TIMES DAILY
Qty: 21 CAPSULE | Refills: 0 | Status: SHIPPED | OUTPATIENT
Start: 2021-06-04 | End: 2021-06-11

## 2021-06-04 RX ORDER — KETOROLAC TROMETHAMINE 30 MG/ML
15 INJECTION, SOLUTION INTRAMUSCULAR; INTRAVENOUS ONCE
Status: COMPLETED | OUTPATIENT
Start: 2021-06-04 | End: 2021-06-04

## 2021-06-04 RX ORDER — DEXAMETHASONE SODIUM PHOSPHATE 4 MG/ML
8 INJECTION, SOLUTION INTRA-ARTICULAR; INTRALESIONAL; INTRAMUSCULAR; INTRAVENOUS; SOFT TISSUE ONCE
Status: COMPLETED | OUTPATIENT
Start: 2021-06-04 | End: 2021-06-04

## 2021-06-04 RX ORDER — GABAPENTIN 300 MG/1
300 CAPSULE ORAL ONCE
Status: COMPLETED | OUTPATIENT
Start: 2021-06-04 | End: 2021-06-04

## 2021-06-04 RX ORDER — CLINDAMYCIN HYDROCHLORIDE 150 MG/1
600 CAPSULE ORAL ONCE
Status: COMPLETED | OUTPATIENT
Start: 2021-06-04 | End: 2021-06-04

## 2021-06-04 RX ADMIN — KETOROLAC TROMETHAMINE 15 MG: 30 INJECTION, SOLUTION INTRAMUSCULAR; INTRAVENOUS at 02:29

## 2021-06-04 RX ADMIN — CLINDAMYCIN HYDROCHLORIDE 600 MG: 150 CAPSULE ORAL at 02:28

## 2021-06-04 RX ADMIN — GABAPENTIN 300 MG: 300 CAPSULE ORAL at 00:27

## 2021-06-04 RX ADMIN — LIDOCAINE HYDROCHLORIDE 30 ML: 20 SOLUTION OROPHARYNGEAL at 00:27

## 2021-06-04 RX ADMIN — DEXAMETHASONE SODIUM PHOSPHATE 8 MG: 4 INJECTION, SOLUTION INTRA-ARTICULAR; INTRALESIONAL; INTRAMUSCULAR; INTRAVENOUS; SOFT TISSUE at 02:45

## 2021-06-04 RX ADMIN — IOHEXOL 80 ML: 350 INJECTION, SOLUTION INTRAVENOUS at 02:15

## 2021-06-04 ASSESSMENT — ENCOUNTER SYMPTOMS
COUGH: 0
BACK PAIN: 0
SHORTNESS OF BREATH: 0
HEADACHES: 0

## 2021-06-04 NOTE — ED TRIAGE NOTES
Doni Jorge Valverde  55 y.o. M  Chief Complaint   Patient presents with   • Sore Throat     left side, patient reports sharp pain with swollowing. Patient reports feeling relief from drinking milk    • Neck Swelling     started this afternoon, left neck radiating behind the left ear, started this afternoon, patient states it feels like a swollen gland      Vitals:    06/03/21 2215   BP: 120/76   Pulse: 71   Resp: 14   Temp: 36.4 °C (97.6 °F)   SpO2: 94%     Triage process explained to patient, apologized for wait time, and returned to [facility].  Pt informed to notify staff of any change in condition.

## 2021-06-04 NOTE — ED NOTES
"Pt discharged home. IV discontinued and gauze placed, pt in possession of belongings. Pt provided discharge education and information pertaining to medications and follow up appointments. Pt received copy of discharge instructions and verbalized understanding. /74   Pulse 67   Temp 36.4 °C (97.6 °F) (Temporal)   Resp 18   Ht 1.854 m (6' 1\")   Wt (!) 126 kg (277 lb 12.5 oz)   SpO2 88%   BMI 36.65 kg/m²     "

## 2021-06-04 NOTE — ED NOTES
Pt medicated per MAR for pain and swelling. ABX given. Pt provided pillows and blankets per request. Lights dimmed for comfort

## 2021-06-04 NOTE — ED PROVIDER NOTES
ED Provider Note     6/4/2021  12:01 AM    Means of Arrival: Walk In  History obtained by: patient  Limitations: none  PCP: Iris Molina  CODE STATUS: Full    CHIEF COMPLAINT  Chief Complaint   Patient presents with   • Sore Throat     left side, patient reports sharp pain with swollowing. Patient reports feeling relief from drinking milk    • Neck Swelling     started this afternoon, left neck radiating behind the left ear, started this afternoon, patient states it feels like a swollen gland        HPI  Edarmond Valverde is a 55 y.o. male with history of sleep apnea, TIA who presents with left-sided neck pain.  This is early in the day he had a slight painful sensation when swallowing.  Throughout the day the pain increasingly worsened.  He feels a tenderness at the left side of his neck.  He says it feels swollen compared to normal.  Significant pain to the point where he does not want to swallow because it will hurt so much.  He is able to tolerate secretions.  He has not been wanting to eat because of the pain.  He says there is some improvement in pain when drinking milk, but otherwise everything else makes the pain worse.  Pain radiates to the left mandible and ear.  He is not noticed any dental pain.  No fevers.  No cough.  He is a longtime smoker.  He is worried he may be developing an infection.  He does have a previous history of GERD that was related to sleep apnea, has not had any symptoms since starting to use CPAP.    REVIEW OF SYSTEMS  Review of Systems   Respiratory: Negative for cough and shortness of breath.    Cardiovascular: Negative for chest pain.   Musculoskeletal: Negative for back pain.   Neurological: Negative for headaches.   All other systems reviewed and are negative.    See HPI for further details.    PAST MEDICAL HISTORY   has a past medical history of Anxiety, Arthritis, Depression, Headache(784.0), transient ischemic attack (TIA), Insomnia, Sleep apnea, and Varicose vein of  "leg.    SOCIAL HISTORY  Social History     Tobacco Use   • Smoking status: Former Smoker     Packs/day: 1.00     Years: 32.00     Pack years: 32.00     Types: Cigarettes     Quit date: 2020     Years since quittin.4   • Smokeless tobacco: Never Used   Vaping Use   • Vaping Use: Never used   Substance and Sexual Activity   • Alcohol use: Not Currently     Alcohol/week: 0.0 oz   • Drug use: No   • Sexual activity: Yes     Partners: Female       SURGICAL HISTORY   has a past surgical history that includes other and eye surgery.    CURRENT MEDICATIONS  Home Medications     Reviewed by Cassy Church R.N. (Registered Nurse) on 21 at 2228  Med List Status: Not Addressed   Medication Last Dose Status   albuterol 108 (90 Base) MCG/ACT Aero Soln inhalation aerosol  Active   asa/apap/caffeine (EXCEDRIN) 250-250-65 MG Tab  Active   benzonatate (TESSALON) 100 MG Cap  Active   Lactobacillus-Inulin ([facility]) Cap  Active                ALLERGIES  No Known Allergies    PHYSICAL EXAM  VITAL SIGNS: /74   Pulse 67   Temp 36.4 °C (97.6 °F) (Temporal)   Resp 18   Ht 1.854 m (6' 1\")   Wt (!) 126 kg (277 lb 12.5 oz)   SpO2 88%   BMI 36.65 kg/m²     Pulse ox interpretation: I interpret this pulse ox as normal.  Constitutional: Alert in no apparent distress.  HENT: No signs of trauma, Bilateral external ears normal, Normal TMs. Nose normal.  Coarse voice.  No trismus.  Posterior pharynx there is slight enlargement of the left tonsil compared to the right tonsil.  There is no exudate.  Maxillary dentures.  There is some dental decay in the mandibular teeth but no obvious edema or localized dental pain.  Eyes: Pupils are equal, Conjunctiva normal, Non-icteric.    Neck: Normal range of motion, Supple, No stridor.    Lymphatic: Tenderness at bilateral anterior neck but unable to appreciate individual enlarged lymph nodes.  Cardiovascular: Regular rate and rhythm, no murmurs. Symmetric distal " pulses. No cyanosis of extremities. No peripheral edema of extremities.  Thorax & Lungs: Normal breath sounds, No respiratory distress, No wheezing, No chest tenderness.   Abdomen: Soft, No tenderness  Skin: Warm, Dry, No erythema, No rash.   Musculoskeletal: Good range of motion in all major joints. No tenderness to palpation or major deformities noted.   Neurologic: Alert , Normal motor function, Normal sensory function, No focal deficits noted.   Psychiatric: Affect normal, Judgment normal, Mood normal.   Physical Exam      DIAGNOSTIC STUDIES / PROCEDURES      LABS  Pertinent Labs & Imaging studies reviewed. (See chart for details)    RADIOLOGY  Pertinent Labs & Imaging studies reviewed. (See chart for details)    COURSE & MEDICAL DECISION MAKING  Pertinent Labs & Imaging studies reviewed. (See chart for details)    12:01 AM This is an emergent evaluation of a  55 y.o. male who presents with neck pain, pain with swallowing.  He is concerned about infection.  I have similar concerns given progressively worsening pain over the past 24 hours.  This could be a mild pharyngitis viral etiology.  Posterior pharynx is not appear erythematous or exudative, strep pharyngitis less likely.  There could be a deep space infection.  Symptoms could be due to GERD, especially because drinking milk will briefly improve the pain.  Work-up will include CBC, CMP, CT neck with contrast..     3:20 AM  CT shows a tonsillar abscess, approximately 1 cm diameter.  Minimal elevation in white count 11.5.  I ordered Toradol, dexamethasone, clindamycin for initial treatment.  I spoke with Dr. Roosevelt MD, ENT.  He agrees with my plan thus far, given small size of abscess will not require incision and drainage.  I have sent a prescription for clindamycin to his pharmacy.  He understands that if he is having worsening pain, trouble breathing, inability to swallow he needs to come right back to the emergency department for further evaluation  and treatment.     The patient will return for worsening symptoms and is stable at the time of discharge. The patient verbalizes understanding. Guidance was provided on appropriate use of medications including driving under the influence, overdose, and side effects.         FINAL IMPRESSION    ICD-10-CM   1. Tonsillitis  J03.90   2. Tonsillar abscess  J36            This dictation was created using voice recognition software. The accuracy of the dictation is limited to the abilities of the software. I expect there may be some errors of grammar and possibly content. The nursing notes were reviewed and certain aspects of this information were incorporated into this note.    Electronically signed by: Omid Brand II, M.D., 6/4/2021 12:01 AM

## 2021-06-09 NOTE — PROGRESS NOTES
Called from Elisa with laboratory to report critical value of CPK 25743. Critical value read back and confirmed.  Dr. Arrington informed, critical valued read back and confirmed. No new orders at this time.    ANNE Paige- Patient had a large BM here. Feeling much better. Will DC on Miralax. Will follow up with PCP. Strict ED return precautions discussed. Patient understands and agrees.

## 2021-06-30 ENCOUNTER — OFFICE VISIT (OUTPATIENT)
Dept: MEDICAL GROUP | Facility: MEDICAL CENTER | Age: 56
End: 2021-06-30
Payer: COMMERCIAL

## 2021-06-30 VITALS
HEART RATE: 93 BPM | SYSTOLIC BLOOD PRESSURE: 120 MMHG | BODY MASS INDEX: 36.31 KG/M2 | HEIGHT: 73 IN | DIASTOLIC BLOOD PRESSURE: 78 MMHG | TEMPERATURE: 98.2 F | OXYGEN SATURATION: 96 % | WEIGHT: 274 LBS

## 2021-06-30 DIAGNOSIS — R73.03 PREDIABETES: ICD-10-CM

## 2021-06-30 DIAGNOSIS — Z83.3 FAMILY HISTORY OF DIABETES MELLITUS IN FATHER: ICD-10-CM

## 2021-06-30 DIAGNOSIS — R79.89 ELEVATED LFTS: ICD-10-CM

## 2021-06-30 DIAGNOSIS — M54.50 CHRONIC LEFT-SIDED LOW BACK PAIN WITHOUT SCIATICA: ICD-10-CM

## 2021-06-30 DIAGNOSIS — R74.01 TRANSAMINITIS: ICD-10-CM

## 2021-06-30 DIAGNOSIS — Z00.00 ANNUAL PHYSICAL EXAM: ICD-10-CM

## 2021-06-30 DIAGNOSIS — G89.29 CHRONIC LEFT-SIDED LOW BACK PAIN WITHOUT SCIATICA: ICD-10-CM

## 2021-06-30 DIAGNOSIS — E78.2 MIXED HYPERLIPIDEMIA: ICD-10-CM

## 2021-06-30 DIAGNOSIS — Z80.42 FAMILY HISTORY OF PROSTATE CANCER IN FATHER: ICD-10-CM

## 2021-06-30 PROBLEM — J96.01 ACUTE RESPIRATORY FAILURE WITH HYPOXIA (HCC): Status: RESOLVED | Noted: 2019-01-07 | Resolved: 2021-06-30

## 2021-06-30 PROCEDURE — 99214 OFFICE O/P EST MOD 30 MIN: CPT | Performed by: NURSE PRACTITIONER

## 2021-06-30 RX ORDER — NAPROXEN 500 MG/1
500 TABLET ORAL 2 TIMES DAILY WITH MEALS
Qty: 30 TABLET | Refills: 0 | Status: SHIPPED | OUTPATIENT
Start: 2021-06-30

## 2021-06-30 RX ORDER — TIZANIDINE 4 MG/1
4 TABLET ORAL EVERY 6 HOURS PRN
Qty: 30 TABLET | Refills: 3 | Status: SHIPPED | OUTPATIENT
Start: 2021-06-30 | End: 2021-08-10

## 2021-06-30 ASSESSMENT — PATIENT HEALTH QUESTIONNAIRE - PHQ9
3. TROUBLE FALLING OR STAYING ASLEEP OR SLEEPING TOO MUCH: NEARLY EVERY DAY
2. FEELING DOWN, DEPRESSED, IRRITABLE, OR HOPELESS: MORE THAN HALF THE DAYS
SUM OF ALL RESPONSES TO PHQ QUESTIONS 1-9: 16
9. THOUGHTS THAT YOU WOULD BE BETTER OFF DEAD, OR OF HURTING YOURSELF: NOT AT ALL
SUM OF ALL RESPONSES TO PHQ9 QUESTIONS 1 AND 2: 4
1. LITTLE INTEREST OR PLEASURE IN DOING THINGS: MORE THAN HALF THE DAYS
4. FEELING TIRED OR HAVING LITTLE ENERGY: NEARLY EVERY DAY
7. TROUBLE CONCENTRATING ON THINGS, SUCH AS READING THE NEWSPAPER OR WATCHING TELEVISION: NOT AT ALL
8. MOVING OR SPEAKING SO SLOWLY THAT OTHER PEOPLE COULD HAVE NOTICED. OR THE OPPOSITE, BEING SO FIGETY OR RESTLESS THAT YOU HAVE BEEN MOVING AROUND A LOT MORE THAN USUAL: MORE THAN HALF THE DAYS
6. FEELING BAD ABOUT YOURSELF - OR THAT YOU ARE A FAILURE OR HAVE LET YOURSELF OR YOUR FAMILY DOWN: SEVERAL DAYS
5. POOR APPETITE OR OVEREATING: NEARLY EVERY DAY

## 2021-06-30 ASSESSMENT — FIBROSIS 4 INDEX: FIB4 SCORE: 0.67

## 2021-06-30 NOTE — PROGRESS NOTES
"Subjective:   Doni Valverde is a 56 y.o. male here today for back pain    Chronic left-sided low back pain without sciatica  Ongoing for years. Has flared recently with job changed, needing to bend more. Has been taking Advil 400 mg before bed, which significantly helps.     No numbness/tingling/pain down the leg, no lower extremity weakness, no bowel or bladder changes.     Has done PT in the past after an accident, nothing recent.     Worse with sitting for long periods, has a hard time standing to straighten up.     Once he is up and moving it improves, but when he lays down at night        Current medicines (including changes today)  Current Outpatient Medications   Medication Sig Dispense Refill   • naproxen (NAPROSYN) 500 MG Tab Take 1 tablet by mouth 2 times a day with meals. 30 tablet 0   • tizanidine (ZANAFLEX) 4 MG Tab Take 1 tablet by mouth every 6 hours as needed. 30 tablet 3   • albuterol 108 (90 Base) MCG/ACT Aero Soln inhalation aerosol Inhale 2 Puffs by mouth every 6 hours as needed for Shortness of Breath. 8.5 g 0     No current facility-administered medications for this visit.     He  has a past medical history of Anxiety, Arthritis, Depression, Headache(784.0), transient ischemic attack (TIA), Insomnia, Sleep apnea, and Varicose vein of leg.    ROS   No chest pain, no shortness of breath, no abdominal pain  Positive ROS as per HPI.  All other systems reviewed and are negative.     Objective:     /78 (BP Location: Right arm, Patient Position: Sitting, BP Cuff Size: Large adult long)   Pulse 93   Temp 36.8 °C (98.2 °F) (Temporal)   Ht 1.854 m (6' 1\")   Wt 124 kg (274 lb)   SpO2 96%  Body mass index is 36.15 kg/m².     Physical Exam:  Constitutional: Alert, no distress.  Skin: Warm, dry, good turgor, no rashes in visible areas.  Eye: Equal, round and reactive, conjunctiva clear, lids normal.  ENMT: Mask in place  Respiratory: Unlabored respiratory effort  Psych: Alert and oriented " x3, normal affect and mood.        Assessment and Plan:   The following treatment plan was discussed    1. Chronic left-sided low back pain without sciatica  Unstable  Naproxen BID PRN, tizanidine nightly prn  Discussed importance of weight loss, stretching, strengthening core, and good ergonomics.   Follow up with PT  - REFERRAL TO PHYSICAL THERAPY  - naproxen (NAPROSYN) 500 MG Tab; Take 1 tablet by mouth 2 times a day with meals.  Dispense: 30 tablet; Refill: 0  - tizanidine (ZANAFLEX) 4 MG Tab; Take 1 tablet by mouth every 6 hours as needed.  Dispense: 30 tablet; Refill: 3    2. Annual physical exam  - CBC WITH DIFFERENTIAL; Future  - Comp Metabolic Panel; Future  - HEMOGLOBIN A1C; Future  - MICROALBUMIN CREAT RATIO URINE; Future  - Lipid Profile; Future  - TSH WITH REFLEX TO FT4; Future  - VITAMIN D,25 HYDROXY; Future  - PSA TOTAL + %FREE; Future    3. Transaminitis  - Comp Metabolic Panel; Future    4. Prediabetes  - HEMOGLOBIN A1C; Future    5. Mixed hyperlipidemia  - Lipid Profile; Future    6. Family history of prostate cancer in father  - PSA TOTAL + %FREE; Future    7. Family history of diabetes mellitus in father  - HEMOGLOBIN A1C; Future        Followup: Return in about 4 weeks (around 7/28/2021).    I have placed the below orders and discussed them with an approved delegating provider. The MA is performing the below orders under the direction of Dr. Shelley

## 2021-06-30 NOTE — ASSESSMENT & PLAN NOTE
Ongoing for years. Has flared recently with job changed, needing to bend more. Has been taking Advil 400 mg before bed, which significantly helps.     No numbness/tingling/pain down the leg, no lower extremity weakness, no bowel or bladder changes.     Has done PT in the past after an accident, nothing recent.     Worse with sitting for long periods, has a hard time standing to straighten up.     Once he is up and moving it improves, but when he lays down at night

## 2021-08-09 ENCOUNTER — TELEPHONE (OUTPATIENT)
Dept: MEDICAL GROUP | Facility: MEDICAL CENTER | Age: 56
End: 2021-08-09

## 2021-08-09 NOTE — TELEPHONE ENCOUNTER
"1. Caller Name: Edward Carolina Baron  Call Back Number: [phone]    Pt reports that he was in contact with 4 people last Wednesday 08/05/2021, who now have symptoms, one of which has been hospitalized. He reports that he \"doesn't feel right\" and has been feeling hot/cold, and hot last night but no fever. His symptoms started a couple of days ago. He is sharing a household with kids who were at that house they visited last Wednesday, who are now experiencing diarrhea.     Yesterday 2 of those family members visited and are now feeling sick, he was in contact with them yesterday. Pt has appt with Urgent Care tomorrow, but was told he may not get tested. Pt is requesting COVID test. Please advise.  "

## 2021-08-09 NOTE — TELEPHONE ENCOUNTER
Spoke to PCP, Tracy advises pt keep appt to be evaluated in person at . Tried to call pt, left voicemail for pt to call back. ERPLYt message sent.

## 2021-08-09 NOTE — TELEPHONE ENCOUNTER
1. Caller Name: Edward Jorge Baron  Call Back Number: [phone]    Pt left message requesting COVID test because he was in contact with someone who was positive who is being hospitalized for COVID. Please advise.

## 2021-08-10 ENCOUNTER — OFFICE VISIT (OUTPATIENT)
Dept: URGENT CARE | Facility: PHYSICIAN GROUP | Age: 56
End: 2021-08-10
Payer: COMMERCIAL

## 2021-08-10 ENCOUNTER — HOSPITAL ENCOUNTER (OUTPATIENT)
Facility: MEDICAL CENTER | Age: 56
End: 2021-08-10
Attending: PHYSICIAN ASSISTANT
Payer: COMMERCIAL

## 2021-08-10 VITALS
BODY MASS INDEX: 34.19 KG/M2 | DIASTOLIC BLOOD PRESSURE: 62 MMHG | TEMPERATURE: 98.2 F | SYSTOLIC BLOOD PRESSURE: 100 MMHG | RESPIRATION RATE: 16 BRPM | HEIGHT: 73 IN | OXYGEN SATURATION: 96 % | HEART RATE: 72 BPM | WEIGHT: 258 LBS

## 2021-08-10 DIAGNOSIS — Z20.822 EXPOSURE TO CONFIRMED CASE OF COVID-19: ICD-10-CM

## 2021-08-10 DIAGNOSIS — R19.7 DIARRHEA, UNSPECIFIED TYPE: ICD-10-CM

## 2021-08-10 DIAGNOSIS — R53.83 FATIGUE, UNSPECIFIED TYPE: ICD-10-CM

## 2021-08-10 DIAGNOSIS — R50.9 FEVER, UNSPECIFIED FEVER CAUSE: ICD-10-CM

## 2021-08-10 PROCEDURE — U0005 INFEC AGEN DETEC AMPLI PROBE: HCPCS

## 2021-08-10 PROCEDURE — U0003 INFECTIOUS AGENT DETECTION BY NUCLEIC ACID (DNA OR RNA); SEVERE ACUTE RESPIRATORY SYNDROME CORONAVIRUS 2 (SARS-COV-2) (CORONAVIRUS DISEASE [COVID-19]), AMPLIFIED PROBE TECHNIQUE, MAKING USE OF HIGH THROUGHPUT TECHNOLOGIES AS DESCRIBED BY CMS-2020-01-R: HCPCS

## 2021-08-10 PROCEDURE — 99213 OFFICE O/P EST LOW 20 MIN: CPT | Mod: CS | Performed by: PHYSICIAN ASSISTANT

## 2021-08-10 ASSESSMENT — FIBROSIS 4 INDEX: FIB4 SCORE: 0.67

## 2021-08-10 NOTE — PROGRESS NOTES
Subjective:      Doni Valverde is a 56 y.o. male who presents with Diarrhea ([id number] )    Medications:    • albuterol Aers  • naproxen Tabs  • tizanidine Tabs    Allergies: Patient has no known allergies.    Problem List: Doni Valverde does not have any pertinent problems on file.    Surgical History:  Past Surgical History:   Procedure Laterality Date   • EYE SURGERY     • OTHER      Varicose vein stripping       Past Social Hx: Doni Valverde  reports that he has been smoking cigarettes. He has a 32.00 pack-year smoking history. He has never used smokeless tobacco. He reports previous alcohol use. He reports that he does not use drugs.     Past Family Hx:  Doni Valverde family history includes Alcohol/Drug in his father; Arthritis in his father and mother; Cancer in his father; Diabetes in his father; Genetic Disorder in his mother; Hyperlipidemia in his father and mother; Hypertension in his father; Kidney Disease in his father; Lung Disease in his father; Other in his mother. Not pertinent to today's visit.       Problem list, medications, and allergies reviewed by myself today in Epic.            Patient presents with:  Diarrhea: [id number] , exposed to +covid person.  Pt would like a covid test today.  Patient has not had COVID-19 infection, has not been vaccinated.      Diarrhea   This is a new problem. The current episode started in the past 7 days. The problem occurs 2 to 4 times per day. The problem has been waxing and waning. The stool consistency is described as watery. The patient states that diarrhea does not awaken him from sleep. Pertinent negatives include no abdominal pain, bloating, chills, coughing, fever, myalgias, sweats, URI or vomiting. Exacerbated by: all po intake. Risk factors include ill contacts. He has tried nothing for the symptoms. The treatment provided no relief. There is no history of bowel resection, inflammatory bowel disease or irritable bowel  "syndrome.       Review of Systems   Constitutional: Positive for malaise/fatigue. Negative for chills and fever.   Respiratory: Negative for cough.    Gastrointestinal: Positive for diarrhea. Negative for abdominal pain, bloating, blood in stool, constipation, nausea and vomiting.   Musculoskeletal: Negative for myalgias.   All other systems reviewed and are negative.         Objective:     /62   Pulse 72   Temp 36.8 °C (98.2 °F) (Temporal)   Resp 16   Ht 1.854 m (6' 1\")   Wt 117 kg (258 lb)   SpO2 96%   BMI 34.04 kg/m²      Physical Exam  Vitals and nursing note reviewed.   Constitutional:       General: He is not in acute distress.     Appearance: Normal appearance. He is well-developed. He is not ill-appearing or toxic-appearing.   HENT:      Head: Normocephalic and atraumatic.      Right Ear: Tympanic membrane normal.      Left Ear: Tympanic membrane normal.      Nose: Nose normal.      Mouth/Throat:      Lips: Pink.      Mouth: Mucous membranes are moist.      Pharynx: Oropharynx is clear. Uvula midline.   Eyes:      Extraocular Movements: Extraocular movements intact.      Conjunctiva/sclera: Conjunctivae normal.      Pupils: Pupils are equal, round, and reactive to light.   Cardiovascular:      Rate and Rhythm: Normal rate and regular rhythm.      Pulses: Normal pulses.      Heart sounds: Normal heart sounds.   Pulmonary:      Effort: Pulmonary effort is normal.      Breath sounds: Normal breath sounds.   Abdominal:      General: Bowel sounds are normal. There is no distension.      Palpations: Abdomen is soft.      Tenderness: There is no abdominal tenderness.   Musculoskeletal:         General: Normal range of motion.      Cervical back: Normal range of motion and neck supple.   Skin:     General: Skin is warm and dry.      Capillary Refill: Capillary refill takes less than 2 seconds.   Neurological:      General: No focal deficit present.      Mental Status: He is alert and oriented to person, " place, and time.      Cranial Nerves: No cranial nerve deficit.      Motor: Motor function is intact.      Coordination: Coordination is intact.      Gait: Gait normal.   Psychiatric:         Mood and Affect: Mood normal.              Assessment/Plan:           1. Exposure to confirmed case of COVID-19  COVID/SARS CoV-2 PCR   2. Diarrhea, unspecified type  COVID/SARS CoV-2 PCR   3. Fever, unspecified fever cause  COVID/SARS CoV-2 PCR   4. Fatigue, unspecified type  COVID/SARS CoV-2 PCR     Per protocol for PUI/ISO patients, the patient was evaluated by me while I was wearing PPE.  Per CDC guidelines, patient has been instructed to self quarantine at home for at least 10 days from onset of symptoms and at least 3 full days after resolution of fever/respiratory symptoms.  PT verbalized agreement to do so.      PT can begin/ continue OTC medications, increase fluids and rest until symptoms improve.     PT should follow up with PCP in 1-2 days for re-evaluation if symptoms have not improved.      Discussed red flags and reasons to return to UC or ED.      Pt and/or family verbalized understanding of diagnosis and follow up instructions and was offered informational handout on diagnosis.  PT discharged.

## 2021-08-11 LAB
COVID ORDER STATUS COVID19: NORMAL
SARS-COV-2 RNA RESP QL NAA+PROBE: NOTDETECTED
SPECIMEN SOURCE: NORMAL

## 2021-08-14 ASSESSMENT — ENCOUNTER SYMPTOMS
ABDOMINAL PAIN: 0
CHILLS: 0
VOMITING: 0
FEVER: 0
MYALGIAS: 0
BLOOD IN STOOL: 0
COUGH: 0
DIARRHEA: 1
SWEATS: 0
NAUSEA: 0
CONSTIPATION: 0
BLOATING: 0

## 2021-08-15 ENCOUNTER — HOSPITAL ENCOUNTER (EMERGENCY)
Facility: MEDICAL CENTER | Age: 56
End: 2021-08-16
Attending: EMERGENCY MEDICINE
Payer: COMMERCIAL

## 2021-08-15 DIAGNOSIS — M54.50 LUMBAR BACK PAIN: ICD-10-CM

## 2021-08-15 DIAGNOSIS — M54.10 RADICULOPATHY, UNSPECIFIED SPINAL REGION: ICD-10-CM

## 2021-08-15 PROCEDURE — 99283 EMERGENCY DEPT VISIT LOW MDM: CPT

## 2021-08-15 ASSESSMENT — FIBROSIS 4 INDEX: FIB4 SCORE: 0.67

## 2021-08-16 ENCOUNTER — APPOINTMENT (OUTPATIENT)
Dept: RADIOLOGY | Facility: MEDICAL CENTER | Age: 56
End: 2021-08-16
Attending: EMERGENCY MEDICINE
Payer: COMMERCIAL

## 2021-08-16 VITALS
OXYGEN SATURATION: 94 % | SYSTOLIC BLOOD PRESSURE: 122 MMHG | WEIGHT: 268 LBS | RESPIRATION RATE: 16 BRPM | BODY MASS INDEX: 35.52 KG/M2 | TEMPERATURE: 99 F | DIASTOLIC BLOOD PRESSURE: 81 MMHG | HEIGHT: 73 IN | HEART RATE: 65 BPM

## 2021-08-16 PROCEDURE — 72110 X-RAY EXAM L-2 SPINE 4/>VWS: CPT

## 2021-08-16 PROCEDURE — 96372 THER/PROPH/DIAG INJ SC/IM: CPT

## 2021-08-16 PROCEDURE — 700111 HCHG RX REV CODE 636 W/ 250 OVERRIDE (IP): Performed by: EMERGENCY MEDICINE

## 2021-08-16 RX ORDER — METHYLPREDNISOLONE 4 MG/1
TABLET ORAL
Qty: 1 EACH | Refills: 0 | Status: SHIPPED | OUTPATIENT
Start: 2021-08-16

## 2021-08-16 RX ORDER — GABAPENTIN 300 MG/1
300 CAPSULE ORAL NIGHTLY PRN
Qty: 30 CAPSULE | Refills: 0 | Status: SHIPPED | OUTPATIENT
Start: 2021-08-16 | End: 2021-09-15

## 2021-08-16 RX ORDER — HYDROMORPHONE HYDROCHLORIDE 1 MG/ML
1 INJECTION, SOLUTION INTRAMUSCULAR; INTRAVENOUS; SUBCUTANEOUS ONCE
Status: COMPLETED | OUTPATIENT
Start: 2021-08-16 | End: 2021-08-16

## 2021-08-16 RX ADMIN — HYDROMORPHONE HYDROCHLORIDE 1 MG: 1 INJECTION, SOLUTION INTRAMUSCULAR; INTRAVENOUS; SUBCUTANEOUS at 01:13

## 2021-08-16 NOTE — ED NOTES
Discharge teaching and paperwork provided regarding lumbar back pain and all questions/concerns answered. VSS, assessment stable. Given information regarding home care and reasons to follow up with ED or primary MD. Patient provided with solumedrol & gabapentin Rx.

## 2021-08-16 NOTE — DISCHARGE INSTRUCTIONS
You were seen in the ER for low back pain. Thankfully your exam and xrays are reassuring and you do not require additional workup, consultation, or admission to the hospital. I suspect you likely ruptured a disc but I would like you follow up with a spine specialist for further workup. I have given you a prescription for a medrol dosepak as well as gabapentin. Please take these as directed. Do not drink alcohol or drive after taking the gabapentin as it will make you sleepy. If you have GERD/heart burn, please take an over the counter prilosec with the medrol dosepak. Follow up with your PCP and spine physician this week for recheck and further workup. Return to the ER with any new or worsening symptoms. Good luck, I hope you feel better soon!

## 2021-08-16 NOTE — ED NOTES
Pt states he initially had numbness radiating down legs bilaterally, but now has good sensation & no numbness, just occasional sharp shooting pain down legs bilat.

## 2021-08-16 NOTE — ED TRIAGE NOTES
"Chief Complaint   Patient presents with   • Low Back Pain     hx of low back pain worse today.      Pt wheeled into triage for above complaint. Pt states he was doing yard work today and developed severe low back pain. Pt states he does have a hx of low back pain but has never had this pain before. Pt states he is having shooting tingling pain down both of his legs. Pt denies any numbness or episodes of incontinence. Pt states he took naproxen and a muscle relaxer earlier w/ no relief. Pt placed back in the lobby and educated to alert staff of any changes or concerns.    /79   Pulse 67   Temp 37.6 °C (99.7 °F) (Temporal)   Resp 18   Ht 1.854 m (6' 1\")   Wt 122 kg (268 lb)   SpO2 95%   BMI 35.36 kg/m²     "

## 2021-08-16 NOTE — ED PROVIDER NOTES
"ED Provider Note    CHIEF COMPLAINT  Chief Complaint   Patient presents with   • Low Back Pain     hx of low back pain worse today.        HPI  Doni Valverde is a 56 y.o. male who presents with a chief complaint of low back pain that started approximately 11 hours prior to evaluation when he was gardening.  He notes that he turned \"the wrong way\" and felt severe pain in his lower back radiating down his right leg.  The pain was so severe he fell to the ground but did not hit his head or lose consciousness.  He was ultimately able to get up off of the ground and ambulate although it is difficult due to pain.  He did tell nursing staff and me initially that he had numbness in his legs but he clarified that the numbness was actually a severe pain and he denies any numbness whatsoever.  He still has pain in the low back that he describes as \"crepitus\" or \"bone-on-bone\".  He has tried naproxen as well as a muscle relaxer that was prescribed to him by his primary care physician without any improvement.  He denies any incontinence either urinary or fecal.  No saddle anesthesia, IV drug use, fevers, recent back injections.    REVIEW OF SYSTEMS  See HPI for further details.  Radicular low back pain.  All other systems are negative.     PAST MEDICAL HISTORY   has a past medical history of Anxiety, Arthritis, Depression, Headache(784.0), transient ischemic attack (TIA), Insomnia, Sleep apnea, and Varicose vein of leg.    SOCIAL HISTORY  Social History     Tobacco Use   • Smoking status: Current Every Day Smoker     Packs/day: 0.50     Years: 32.00     Pack years: 16.00     Types: Cigarettes     Last attempt to quit: 2020     Years since quittin.6   • Smokeless tobacco: Never Used   Vaping Use   • Vaping Use: Never used   Substance and Sexual Activity   • Alcohol use: Not Currently     Alcohol/week: 0.0 oz   • Drug use: No   • Sexual activity: Yes     Partners: Female       SURGICAL HISTORY   has a past surgical " "history that includes other and eye surgery.    CURRENT MEDICATIONS  Home Medications    **Home medications have not yet been reviewed for this encounter**         ALLERGIES  No Known Allergies    PHYSICAL EXAM  VITAL SIGNS: /79   Pulse 67   Temp 37.6 °C (99.7 °F) (Temporal)   Resp 18   Ht 1.854 m (6' 1\")   Wt 122 kg (268 lb)   SpO2 95%   BMI 35.36 kg/m²    Pulse ox interpretation: I interpret this pulse ox as normal.  Constitutional: Alert in no apparent distress.  HENT: No signs of trauma, Bilateral external ears normal, Nose normal. Moist mucous membranes.  Eyes: Pupils are equal and reactive, Conjunctiva normal, Non-icteric.   Neck: Normal range of motion, No tenderness, Supple, No stridor.   Lymphatic: No lymphadenopathy noted.   Cardiovascular: Regular rate and rhythm, no murmurs. Pulses symmetrical.  Thorax & Lungs: Normal breath sounds, No respiratory distress, No wheezing, No chest tenderness.   Abdomen: Bowel sounds normal, Soft, No tenderness, No masses, No pulsatile masses. No peritoneal signs.  Skin: Warm, Dry, No erythema, No rash.   Back: No bony tenderness, no CVA tenderness.   Extremities: Intact distal pulses, No edema, No tenderness, No cyanosis.  Musculoskeletal: Good range of motion in all major joints. No tenderness to palpation or major deformities noted. Positive left straight leg raise.  Neurologic: Alert, full strength and sensation in bilateral lower extremities. No focal deficits noted.   Psychiatric: Affect normal, Judgment normal, Mood normal.     DIAGNOSTIC STUDIES / PROCEDURES    RADIOLOGY  DX-LUMBAR SPINE-4+ VIEWS   Final Result      Negative lumbar spine series including bilateral oblique views.        COURSE & MEDICAL DECISION MAKING  Pertinent Labs & Imaging studies reviewed. (See chart for details)  This is a 56 year old gentleman with radicular low back pain consistent with disc rupture/disc disease. No red flags to suggest spinal cord compression. Despite nursing " notes, patient denies any numbness. Instead, he initially equated severe pain with numbness. He has no weakness in his extremities, no saddle anesthesia, incontinence, urinary retention, fevers or IV drug use. No bony spine tenderness to suggest discitis or osteomyelitis. He has a positive straight leg raise on the left c/w disc disease. Xrays of the lumbar spine were without abnormality. Given a dose of IM dilaudid with significant improvement in pain. Ambulating without difficulty. Given his benign exam, he does not require emergent MRI or spine consult. Will be given a prescription for gabapentin and a medrol dosepak. Discharged in good and stable condition with strict return precautions and contact information for several spine clinics for further workup. F/U with PMD this week for recheck and return with any new or worsening symptoms.    The patient will not drink alcohol nor drive with prescribed medications. The patient will return for worsening symptoms and is stable at the time of discharge. The patient verbalizes understanding and will comply.    FINAL IMPRESSION  1. Radiculopathy, unspecified spinal region     2. Lumbar back pain         Electronically signed by: Yoshi Varela M.D., 8/16/2021 12:30 AM

## 2021-08-16 NOTE — ED NOTES
"Pt by wheelchair to room 61. Appears uncomfortable, ++ difficulty standing up straight & to move. Pt describes low back pain as \"crepitus\" \"like it's broken\"/ bone on bone. No crepitus on palpation or any tenderness directly on palpation.  "

## 2021-08-18 ENCOUNTER — TELEPHONE (OUTPATIENT)
Dept: MEDICAL GROUP | Facility: MEDICAL CENTER | Age: 56
End: 2021-08-18

## 2021-08-18 DIAGNOSIS — G89.29 CHRONIC LEFT-SIDED LOW BACK PAIN WITH BILATERAL SCIATICA: ICD-10-CM

## 2021-08-18 DIAGNOSIS — M54.41 CHRONIC LEFT-SIDED LOW BACK PAIN WITH BILATERAL SCIATICA: ICD-10-CM

## 2021-08-18 DIAGNOSIS — M54.42 CHRONIC LEFT-SIDED LOW BACK PAIN WITH BILATERAL SCIATICA: ICD-10-CM

## 2021-08-18 NOTE — TELEPHONE ENCOUNTER
----- Message from Angeline Russo sent at 8/17/2021  2:45 PM PDT -----  Regarding: Patient pain/referral  Doni went to the ER because he had lower back painto the point where he couldn't move. He had x-rays done. they couldn't see if a disc is broken, instead he needs an MRI is what he was told. He was given medication to treat the pain there. The ER couldn't do the MRI because they're only treating emergencies is what he was told and to reach out to his pcp. He needs a referral for spine. He mentioned he called [facility] and gave his information but doesn't know if his insurance is accepted there. Please give him a call if this can be done  -thank you

## 2021-08-18 NOTE — TELEPHONE ENCOUNTER
VOICEMAIL  1. Caller Name: Edward [place]wood Baron  Call Back Number: [phone] (home)     2. Message: Pt left message requesting referral to a spine doctor. Please see below message.

## 2022-01-03 ENCOUNTER — APPOINTMENT (OUTPATIENT)
Dept: RADIOLOGY | Facility: MEDICAL CENTER | Age: 57
End: 2022-01-03
Attending: PAIN MEDICINE
Payer: COMMERCIAL

## 2022-01-03 DIAGNOSIS — S39.012S BACK STRAIN, SEQUELA: ICD-10-CM

## 2022-01-03 DIAGNOSIS — M54.9 BACK PAIN, UNSPECIFIED BACK LOCATION, UNSPECIFIED BACK PAIN LATERALITY, UNSPECIFIED CHRONICITY: ICD-10-CM

## 2022-01-03 DIAGNOSIS — M54.16 LUMBAR RADICULOPATHY: ICD-10-CM

## 2022-01-03 PROCEDURE — 72148 MRI LUMBAR SPINE W/O DYE: CPT

## 2022-02-25 ENCOUNTER — TELEPHONE (OUTPATIENT)
Dept: MEDICAL GROUP | Facility: MEDICAL CENTER | Age: 57
End: 2022-02-25
Payer: COMMERCIAL

## 2022-02-25 NOTE — TELEPHONE ENCOUNTER
VOICEMAIL  1. Caller Name: Edward [place] Baron  Call Back Number: [phone]     2. Message: Pt left message stating that he viewed his MRI results on [employer] but does not understand them. Please advise.

## 2022-11-11 NOTE — PROGRESS NOTES
No protocol   Pt reports mild heart burn from dinner and is requesting TUMS for heart burn relief. Notified on call hospitalist, Dr. Bueno, and received orders for PRN calcium carbonate (TUMS) 500 mg PO TID.

## 2024-04-05 ENCOUNTER — HOSPITAL ENCOUNTER (EMERGENCY)
Facility: MEDICAL CENTER | Age: 59
End: 2024-04-05
Attending: STUDENT IN AN ORGANIZED HEALTH CARE EDUCATION/TRAINING PROGRAM

## 2024-04-05 VITALS
OXYGEN SATURATION: 94 % | BODY MASS INDEX: 36.38 KG/M2 | SYSTOLIC BLOOD PRESSURE: 134 MMHG | HEIGHT: 73 IN | WEIGHT: 274.47 LBS | TEMPERATURE: 98.8 F | RESPIRATION RATE: 18 BRPM | HEART RATE: 87 BPM | DIASTOLIC BLOOD PRESSURE: 85 MMHG

## 2024-04-05 DIAGNOSIS — J06.9 UPPER RESPIRATORY TRACT INFECTION, UNSPECIFIED TYPE: ICD-10-CM

## 2024-04-05 DIAGNOSIS — B30.9 VIRAL CONJUNCTIVITIS: ICD-10-CM

## 2024-04-05 LAB
FLUAV RNA SPEC QL NAA+PROBE: NEGATIVE
FLUBV RNA SPEC QL NAA+PROBE: NEGATIVE
RSV RNA SPEC QL NAA+PROBE: NEGATIVE
SARS-COV-2 RNA RESP QL NAA+PROBE: NOTDETECTED

## 2024-04-05 PROCEDURE — 99283 EMERGENCY DEPT VISIT LOW MDM: CPT

## 2024-04-05 PROCEDURE — 700101 HCHG RX REV CODE 250: Performed by: STUDENT IN AN ORGANIZED HEALTH CARE EDUCATION/TRAINING PROGRAM

## 2024-04-05 PROCEDURE — 94760 N-INVAS EAR/PLS OXIMETRY 1: CPT

## 2024-04-05 PROCEDURE — 0241U HCHG SARS-COV-2 COVID-19 NFCT DS RESP RNA 4 TRGT ED POC: CPT

## 2024-04-05 PROCEDURE — 94640 AIRWAY INHALATION TREATMENT: CPT

## 2024-04-05 RX ADMIN — ALBUTEROL SULFATE 2.5 MG: 2.5 SOLUTION RESPIRATORY (INHALATION) at 19:06

## 2024-04-06 NOTE — ED PROVIDER NOTES
"ED Provider Note    CHIEF COMPLAINT  Chief Complaint   Patient presents with    Red Eye     BL red eyes that itch badly x 2 weeks with purulent drainage.     Flu Like Symptoms     Cough, sore throat and congestion x 2 weeks.        EXTERNAL RECORDS REVIEWED  Outpatient Notes office visit on 8/10/2021 for COVID exposure    HPI/ROS  LIMITATION TO HISTORY   Select: : None  OUTSIDE HISTORIAN(S):    Doni Valverde is a 58 y.o. male who presents with eye redness, itchiness, drainage.  Patient also reports runny nose, sore throat, cough for 2 weeks.  Patient denies fevers.  Patient reports history of smoking and having \"bad lungs\".  Patient had multiple sick contacts when this first started.    PAST MEDICAL HISTORY   has a past medical history of Anxiety, Arthritis, Depression, Headache(784.0), transient ischemic attack (TIA), Insomnia, Sleep apnea, and Varicose vein of leg.    SURGICAL HISTORY   has a past surgical history that includes other and eye surgery.    FAMILY HISTORY  Family History   Problem Relation Age of Onset    Cancer Father         Prostate    Diabetes Father     Kidney Disease Father     Lung Disease Father     Arthritis Father     Hypertension Father     Hyperlipidemia Father     Alcohol/Drug Father     Other Mother         Hypoglycemia/Vein issues    Arthritis Mother     Genetic Disorder Mother     Hyperlipidemia Mother        SOCIAL HISTORY  Social History     Tobacco Use    Smoking status: Every Day     Current packs/day: 0.00     Average packs/day: 0.5 packs/day for 32.0 years (16.0 ttl pk-yrs)     Types: Cigarettes     Start date: 1988     Last attempt to quit: 2020     Years since quittin.2    Smokeless tobacco: Never   Vaping Use    Vaping Use: Never used   Substance and Sexual Activity    Alcohol use: Not Currently     Alcohol/week: 0.0 oz    Drug use: No    Sexual activity: Yes     Partners: Female       CURRENT MEDICATIONS  Home Medications       Reviewed by Leny Baker, " "DIVINE (Registered Nurse) on 04/05/24 at 1756  Med List Status: Partial     Medication Last Dose Status   methylPREDNISolone (MEDROL DOSEPAK) 4 MG Tablet Therapy Pack  Active   naproxen (NAPROSYN) 500 MG Tab  Active                    ALLERGIES  No Known Allergies    PHYSICAL EXAM  VITAL SIGNS: /77   Pulse 80   Temp 36.6 °C (97.8 °F) (Temporal)   Resp 20   Ht 1.854 m (6' 1\")   Wt 124 kg (274 lb 7.6 oz)   SpO2 95%   BMI 36.21 kg/m²    Vitals and nursing note reviewed.   Constitutional:       Comments: Patient is lying in bed supine, pleasant, conversant, speaking in complete sentences   HENT:      Head: Normocephalic and atraumatic  Nasal congestion  No facial tenderness.   Eyes:      Extraocular Movements: Extraocular movements intact.      Conjunctiva/sclera: Bilateral conjunctival injection     Pupils: Pupils are equal, round, and reactive to light.   Clear drainage  No periorbital erythema or swelling  Cardiovascular:      Pulses: Normal pulses.      Comments: HR 80  Pulmonary:      Effort: Pulmonary effort is normal. No respiratory distress.   Expiratory wheezes, coughing  Musculoskeletal:         General: No swelling. Normal range of motion.      Cervical back: Normal range of motion. No rigidity.   Skin:     General: Skin is warm and dry.      Capillary Refill: Capillary refill takes less than 2 seconds.   Neurological:      Mental Status: Alert.       EKG/LABS  Results for orders placed or performed during the hospital encounter of 04/05/24   POC CoV-2, FLU A/B, RSV by PCR   Result Value Ref Range    POC Influenza A RNA, PCR Negative Negative    POC Influenza B RNA, PCR Negative Negative    POC RSV, by PCR Negative Negative    POC SARS-CoV-2, PCR NotDetected      I have independently interpreted this EKG    RADIOLOGY  I have independently interpreted the diagnostic imaging associated with this visit and am waiting the final reading from the radiologist.     Radiologist interpretation:  No orders to " display       COURSE & MEDICAL DECISION MAKING    ASSESSMENT, COURSE AND PLAN  Care Narrative: Patient has no meningismus, acting appropriately, no confusion, meningitis versus encephalitis is inconsistent with patient presentation at this time.  Patient has no posterior oropharyngeal erythema or exudates, no lymphadenopathy, strep pharyngitis is inconsistent with patient presentation at this time.  Tympanic membranes have no evidence of air-fluid levels, exudates, loss of light reflex, perforation or purulent drainage, otitis media is inconsistent with patient presentation at this time.  Lungs are clear to auscultation, no hypoxia, no evidence of rales, pneumonia is inconsistent with patient presentation at this time.   Patient's vital signs are within normal limits, sepsis is inconsistent with patient presentation at this time. I believe it is likely that this patient is suffering from an acute viral process.  No evidence of preseptal cellulitis or bacterial conjunctivitis, symptoms consistent with viral conjunctivitis.  Given wheezing DuoNeb will be provided.  Patient is negative for flu, COVID, RSV.    Electronically signed by: Francisco Lopez M.D., 4/5/2024 7:06 PM    Rest of viral panel negative for flu, COVID, RSV.  Patient feeling better after albuterol treatment. Repeat physical exam benign.  I doubt any serious emergency process at this time.  Patient and/or family, friends given strict return precautions for worsening symptoms and care instructions. They have demonstrated understanding of discharge instructions through teach back mechanism. Advised PCP follow-up in 1-2 days.  Patient/family/friend expresses understanding and agrees to plan.    This dictation has been created using voice recognition software. I am continuously working with the software to minimize the number of voice recognition errors and I have made every attempt to manually correct the errors within my dictation. However errors   related to this voice recognition software may still exist and should be interpreted within the appropriate context.     Electronically signed by: Francisco Lopez M.D., 4/5/2024 7:47 PM      DISPOSITION AND DISCUSSIONS    Escalation of care considered, and ultimately not performed:diagnostic imaging      Decision tools and prescription drugs considered including, but not limited to: Antibiotics not indicated in the absence of bacterial infection .    FINAL DIAGNOSIS  1. Upper respiratory tract infection, unspecified type    2. Viral conjunctivitis           Electronically signed by: Francisco Lopez M.D., 4/5/2024 7:04 PM

## 2024-04-06 NOTE — ED TRIAGE NOTES
"Chief Complaint   Patient presents with    Red Eye     BL red eyes that itch badly x 2 weeks with purulent drainage.     Flu Like Symptoms     Cough, sore throat and congestion x 2 weeks.      Pt ambulated to triage for above complaint.  Pt is AO x 4, follows commands, and responds appropriately to questions. Patient's breathing is unlabored and pain is currently 0/10 on the 0-10 pain scale.  Pt placed in lobby. Patient educated on triage process and encouraged to alert staff for any changes.      /77   Pulse 80   Temp 36.6 °C (97.8 °F) (Temporal)   Resp 16   Ht 1.854 m (6' 1\")   Wt 124 kg (274 lb 7.6 oz)   SpO2 95%     "